# Patient Record
Sex: FEMALE | Race: WHITE | Employment: UNEMPLOYED | URBAN - METROPOLITAN AREA
[De-identification: names, ages, dates, MRNs, and addresses within clinical notes are randomized per-mention and may not be internally consistent; named-entity substitution may affect disease eponyms.]

---

## 2019-01-31 ENCOUNTER — HOSPITAL ENCOUNTER (OUTPATIENT)
Age: 39
Discharge: HOME | End: 2019-02-02
Payer: COMMERCIAL

## 2019-01-31 PROCEDURE — 88304 TISSUE EXAM BY PATHOLOGIST: CPT

## 2019-05-15 ENCOUNTER — OFFICE VISIT (OUTPATIENT)
Dept: FAMILY MEDICINE CLINIC | Age: 39
End: 2019-05-15
Payer: COMMERCIAL

## 2019-05-15 VITALS
HEART RATE: 86 BPM | RESPIRATION RATE: 15 BRPM | DIASTOLIC BLOOD PRESSURE: 72 MMHG | WEIGHT: 217 LBS | SYSTOLIC BLOOD PRESSURE: 100 MMHG | OXYGEN SATURATION: 98 % | BODY MASS INDEX: 36.15 KG/M2 | TEMPERATURE: 97.8 F | HEIGHT: 65 IN

## 2019-05-15 DIAGNOSIS — R05.9 COUGH: ICD-10-CM

## 2019-05-15 DIAGNOSIS — H65.03 BILATERAL ACUTE SEROUS OTITIS MEDIA, RECURRENCE NOT SPECIFIED: Primary | ICD-10-CM

## 2019-05-15 PROCEDURE — 99213 OFFICE O/P EST LOW 20 MIN: CPT | Performed by: FAMILY MEDICINE

## 2019-05-15 RX ORDER — POTASSIUM CITRATE 15 MEQ/1
15 TABLET, EXTENDED RELEASE ORAL
COMMUNITY

## 2019-05-15 RX ORDER — SULFAMETHOXAZOLE AND TRIMETHOPRIM 800; 160 MG/1; MG/1
1 TABLET ORAL 2 TIMES DAILY
Qty: 14 TABLET | Refills: 0 | Status: SHIPPED | OUTPATIENT
Start: 2019-05-15 | End: 2019-05-22

## 2019-05-15 RX ORDER — HYOSCYAMINE SULFATE EXTENDED-RELEASE 0.38 MG/1
375 TABLET ORAL EVERY 12 HOURS PRN
COMMUNITY
End: 2021-10-04

## 2019-05-15 RX ORDER — FLUVOXAMINE MALEATE 100 MG
150 TABLET ORAL NIGHTLY
COMMUNITY

## 2019-05-15 RX ORDER — PREGABALIN 100 MG/1
100 CAPSULE ORAL 2 TIMES DAILY
COMMUNITY
End: 2019-08-16 | Stop reason: SDUPTHER

## 2019-05-15 RX ORDER — DEXTROAMPHETAMINE SACCHARATE, AMPHETAMINE ASPARTATE, DEXTROAMPHETAMINE SULFATE AND AMPHETAMINE SULFATE 2.5; 2.5; 2.5; 2.5 MG/1; MG/1; MG/1; MG/1
10 TABLET ORAL DAILY
COMMUNITY
End: 2021-10-04

## 2019-05-15 RX ORDER — VALACYCLOVIR HYDROCHLORIDE 500 MG/1
500 TABLET, FILM COATED ORAL DAILY
COMMUNITY
End: 2021-10-04

## 2019-05-15 RX ORDER — PREDNISONE 10 MG/1
TABLET ORAL
Qty: 18 TABLET | Refills: 0 | Status: SHIPPED | OUTPATIENT
Start: 2019-05-15 | End: 2019-05-24

## 2019-05-15 ASSESSMENT — ENCOUNTER SYMPTOMS
SHORTNESS OF BREATH: 0
EYE DISCHARGE: 0
DIARRHEA: 0
RHINORRHEA: 1
TROUBLE SWALLOWING: 0
COUGH: 0
NAUSEA: 0
ABDOMINAL PAIN: 0
CHEST TIGHTNESS: 0
BLOOD IN STOOL: 0
SORE THROAT: 1
VOMITING: 0
SINUS PRESSURE: 1
BACK PAIN: 0
EYE REDNESS: 0
SINUS PAIN: 1
EYE PAIN: 0
ALLERGIC/IMMUNOLOGIC NEGATIVE: 1
PHOTOPHOBIA: 0

## 2019-05-15 NOTE — PROGRESS NOTES
5/15/19  Luis Daniel Isbell : 1980 Sex: female  Age: 45 y.o. Chief Complaint   Patient presents with    Otalgia       Congestion, pressure, drainage, facial tenderness, mild headache, onset 2 weeks ago. Denies fever, chills, diaphoresis, nausea, vomiting, decreased oral intake. Denies other GI or  complaints. OTC treatments minimally effective. Review of Systems   Constitutional: Negative for appetite change, fatigue and unexpected weight change. HENT: Positive for congestion, postnasal drip, rhinorrhea, sinus pressure, sinus pain and sore throat. Negative for ear pain, hearing loss and trouble swallowing. Eyes: Negative for photophobia, pain, discharge and redness. Respiratory: Negative for cough, chest tightness and shortness of breath. Cardiovascular: Negative for chest pain, palpitations and leg swelling. Gastrointestinal: Negative for abdominal pain, blood in stool, diarrhea, nausea and vomiting. Endocrine: Negative. Genitourinary: Negative for dysuria, flank pain, frequency and hematuria. Musculoskeletal: Negative for arthralgias, back pain, joint swelling and myalgias. Skin: Negative. Allergic/Immunologic: Negative. Neurological: Negative for dizziness, seizures, syncope, weakness, light-headedness, numbness and headaches. Hematological: Negative for adenopathy. Does not bruise/bleed easily. Psychiatric/Behavioral: Negative. Current Outpatient Medications:     amphetamine-dextroamphetamine (ADDERALL) 10 MG tablet, Take 10 mg by mouth daily. , Disp: , Rfl:     brexpiprazole (REXULTI) 4 MG TABS tablet, Take 4 mg by mouth daily, Disp: , Rfl:     valACYclovir (VALTREX) 500 MG tablet, Take 500 mg by mouth daily, Disp: , Rfl:     fluvoxaMINE (LUVOX) 100 MG tablet, Take 100 mg by mouth nightly 3 po qd at hs, Disp: , Rfl:     Potassium Citrate ER 15 MEQ (1620 MG) TBCR, Take 15 mEq by mouth 4 tabs po qd, Disp: , Rfl:     hyoscyamine (LEVBID) 375 MCG extended release tablet, Take 375 mcg by mouth every 12 hours as needed for Cramping, Disp: , Rfl:     mupirocin (BACTROBAN) 2 % ointment, Apply topically 3 times daily Apply topically 3 times daily. , Disp: , Rfl:     pregabalin (LYRICA) 100 MG capsule, Take 100 mg by mouth 2 times daily. , Disp: , Rfl:     predniSONE (DELTASONE) 10 MG tablet, Take 3 tablets by mouth daily for 3 days, THEN 2 tablets daily for 3 days, THEN 1 tablet daily for 3 days. , Disp: 18 tablet, Rfl: 0    sulfamethoxazole-trimethoprim (BACTRIM DS;SEPTRA DS) 800-160 MG per tablet, Take 1 tablet by mouth 2 times daily for 7 days, Disp: 14 tablet, Rfl: 0    folic acid (FOLVITE) 1 MG tablet, Take 1 mg by mouth daily, Disp: , Rfl:     ALPRAZolam (XANAX) 0.5 MG tablet, Take 0.5 mg by mouth nightly as needed for Sleep, Disp: , Rfl:     lisdexamfetamine (VYVANSE) 50 MG capsule, Take 50 mg by mouth every morning, Disp: , Rfl:     isoniazid (NYDRAZID) 300 MG tablet, Take 300 mg by mouth daily. , Disp: , Rfl:   Allergies   Allergen Reactions    Latex     Ceclor [Cefaclor]     Penicillins        Past Medical History:   Diagnosis Date    Anxiety     Depression     Kidney stone      Past Surgical History:   Procedure Laterality Date    TONSILLECTOMY       No family history on file. Social History     Socioeconomic History    Marital status:      Spouse name: Not on file    Number of children: Not on file    Years of education: Not on file    Highest education level: Not on file   Occupational History    Not on file   Social Needs    Financial resource strain: Not on file    Food insecurity:     Worry: Not on file     Inability: Not on file    Transportation needs:     Medical: Not on file     Non-medical: Not on file   Tobacco Use    Smoking status: Never Smoker    Smokeless tobacco: Never Used   Substance and Sexual Activity    Alcohol use:  Yes    Drug use: No    Sexual activity: Not on file   Lifestyle    Physical activity:     Days per week: Not on file     Minutes per session: Not on file    Stress: Not on file   Relationships    Social connections:     Talks on phone: Not on file     Gets together: Not on file     Attends Congregational service: Not on file     Active member of club or organization: Not on file     Attends meetings of clubs or organizations: Not on file     Relationship status: Not on file    Intimate partner violence:     Fear of current or ex partner: Not on file     Emotionally abused: Not on file     Physically abused: Not on file     Forced sexual activity: Not on file   Other Topics Concern    Not on file   Social History Narrative    Not on file       Vitals:    05/15/19 1605   BP: 100/72   Pulse: 86   Resp: 15   Temp: 97.8 °F (36.6 °C)   TempSrc: Temporal   SpO2: 98%   Weight: 217 lb (98.4 kg)   Height: 5' 4.5\" (1.638 m)       Physical Exam   Constitutional: She is oriented to person, place, and time. She appears well-developed and well-nourished. HENT:   Head: Atraumatic. Right Ear: A middle ear effusion is present. Left Ear: A middle ear effusion is present. Nose: Nose normal.   Mouth/Throat: Posterior oropharyngeal erythema present. No oropharyngeal exudate. Eyes: Pupils are equal, round, and reactive to light. Conjunctivae and EOM are normal.   Neck: Normal range of motion. Neck supple. No tracheal deviation present. No thyromegaly present. Cardiovascular: Normal rate, regular rhythm and intact distal pulses. Exam reveals no gallop and no friction rub. No murmur heard. Pulmonary/Chest: Effort normal and breath sounds normal. No respiratory distress. Abdominal: Soft. Bowel sounds are normal.   Musculoskeletal: Normal range of motion. She exhibits no edema, tenderness or deformity. Lymphadenopathy:     She has no cervical adenopathy. Neurological: She is alert and oriented to person, place, and time. She displays normal reflexes. No sensory deficit.  She exhibits normal muscle

## 2019-08-15 ENCOUNTER — TELEPHONE (OUTPATIENT)
Dept: FAMILY MEDICINE CLINIC | Age: 39
End: 2019-08-15

## 2019-08-15 NOTE — TELEPHONE ENCOUNTER
Pt is requesting a new rx be sent to [facility] on [address] in [place] for her Lyrica, requesting KAYLEE d/t cost of generic. Please call pt with any questions.

## 2019-08-16 DIAGNOSIS — G43.009 MIGRAINE WITHOUT AURA, NOT REFRACTORY: Primary | ICD-10-CM

## 2019-08-16 RX ORDER — PREGABALIN 100 MG/1
100 CAPSULE ORAL 2 TIMES DAILY
Qty: 60 CAPSULE | Refills: 2 | Status: SHIPPED | OUTPATIENT
Start: 2019-08-16 | End: 2019-08-26

## 2019-08-20 PROBLEM — K58.9 IBS (IRRITABLE BOWEL SYNDROME): Status: ACTIVE | Noted: 2019-08-20

## 2019-08-23 ENCOUNTER — TELEPHONE (OUTPATIENT)
Dept: FAMILY MEDICINE CLINIC | Age: 39
End: 2019-08-23

## 2019-08-23 NOTE — TELEPHONE ENCOUNTER
Sy calling about the script for Lyrica that was sent a couple days ago. She said that she does not want the Generic b/c it is more expensive than the brand name. She also wants refills on it.

## 2019-08-26 ENCOUNTER — OFFICE VISIT (OUTPATIENT)
Dept: FAMILY MEDICINE CLINIC | Age: 39
End: 2019-08-26
Payer: COMMERCIAL

## 2019-08-26 ENCOUNTER — HOSPITAL ENCOUNTER (OUTPATIENT)
Age: 39
Discharge: HOME | End: 2019-08-28
Payer: COMMERCIAL

## 2019-08-26 VITALS
RESPIRATION RATE: 15 BRPM | WEIGHT: 215 LBS | HEART RATE: 89 BPM | TEMPERATURE: 97.6 F | SYSTOLIC BLOOD PRESSURE: 110 MMHG | BODY MASS INDEX: 35.82 KG/M2 | HEIGHT: 65 IN | DIASTOLIC BLOOD PRESSURE: 84 MMHG | OXYGEN SATURATION: 99 %

## 2019-08-26 DIAGNOSIS — H65.03 NON-RECURRENT ACUTE SEROUS OTITIS MEDIA OF BOTH EARS: ICD-10-CM

## 2019-08-26 DIAGNOSIS — M54.50 BILATERAL LOW BACK PAIN WITHOUT SCIATICA, UNSPECIFIED CHRONICITY: ICD-10-CM

## 2019-08-26 DIAGNOSIS — R30.0 DYSURIA: ICD-10-CM

## 2019-08-26 DIAGNOSIS — R30.0 DYSURIA: Primary | ICD-10-CM

## 2019-08-26 LAB
APPEARANCE FLUID: NORMAL
BILIRUBIN, POC: NORMAL
BLOOD URINE, POC: NORMAL
CLARITY, POC: CLEAR
COLOR, POC: YELLOW
CONTROL: NORMAL
GLUCOSE URINE, POC: NORMAL
KETONES, POC: NORMAL
LEUKOCYTE EST, POC: NORMAL
NITRITE, POC: NORMAL
PH, POC: 8.5
PREGNANCY TEST URINE, POC: NORMAL
PROTEIN, POC: NORMAL
SPECIFIC GRAVITY, POC: 1.01
UROBILINOGEN, POC: 0.2

## 2019-08-26 PROCEDURE — 87088 URINE BACTERIA CULTURE: CPT

## 2019-08-26 PROCEDURE — 81002 URINALYSIS NONAUTO W/O SCOPE: CPT | Performed by: FAMILY MEDICINE

## 2019-08-26 PROCEDURE — 99214 OFFICE O/P EST MOD 30 MIN: CPT | Performed by: FAMILY MEDICINE

## 2019-08-26 PROCEDURE — 81025 URINE PREGNANCY TEST: CPT | Performed by: FAMILY MEDICINE

## 2019-08-26 RX ORDER — AZITHROMYCIN 250 MG/1
250 TABLET, FILM COATED ORAL SEE ADMIN INSTRUCTIONS
Qty: 6 TABLET | Refills: 0 | Status: SHIPPED | OUTPATIENT
Start: 2019-08-26 | End: 2019-08-31

## 2019-08-26 RX ORDER — PREGABALIN 50 MG/1
50 CAPSULE ORAL 3 TIMES DAILY
Qty: 90 CAPSULE | Refills: 1 | Status: SHIPPED | OUTPATIENT
Start: 2019-08-26 | End: 2019-09-04 | Stop reason: CLARIF

## 2019-08-26 ASSESSMENT — ENCOUNTER SYMPTOMS
SINUS PAIN: 0
EYE REDNESS: 0
EYE PAIN: 0
EYE DISCHARGE: 0
SHORTNESS OF BREATH: 0
BLOOD IN STOOL: 0
ALLERGIC/IMMUNOLOGIC NEGATIVE: 1
VOMITING: 0
TROUBLE SWALLOWING: 0
PHOTOPHOBIA: 0
DIARRHEA: 0
ABDOMINAL PAIN: 0
CHEST TIGHTNESS: 0
SORE THROAT: 0
NAUSEA: 0
COUGH: 0
BACK PAIN: 1

## 2019-08-26 NOTE — PROGRESS NOTES
19  [facility] 1980 Sex: female  Age: 45 y.o. Chief Complaint   Patient presents with    Otalgia    Lower Back Pain       Congestion, pressure, drainage, facial tenderness, mild earache onset 5 days ago. Denies fever, chills, diaphoresis, nausea, vomiting, decreased oral intake. Denies other GI or  complaints. OTC treatments minimally effective. Back pain. The patient states that they are experiencing low back pain. The pain started 2 weeks ago. The patient denies any trauma or injury. The pain is currently a 410 on the pain scale and is described as sharp. The patient has been using heat at home with no relief of their symptoms. The pain is worse with movement. The patient denies any radicular symptoms. They deny any numbness, tingling or weakness to the extremities. They deny any saddle anesthesia. No bowel or bladder incontinence. No fever or chills. No dysuria, urinary, frequency, or gross hematuria. No abdominal pain, nausea, vomiting, or diarrhea. No history of kidney stones. Review of Systems   Constitutional: Negative for appetite change, fatigue and unexpected weight change. HENT: Positive for congestion and ear pain. Negative for hearing loss, sinus pain, sore throat and trouble swallowing. Eyes: Negative for photophobia, pain, discharge and redness. Respiratory: Negative for cough, chest tightness and shortness of breath. Cardiovascular: Negative for chest pain, palpitations and leg swelling. Gastrointestinal: Negative for abdominal pain, blood in stool, diarrhea, nausea and vomiting. Endocrine: Negative. Genitourinary: Negative for dysuria, flank pain, frequency and hematuria. Musculoskeletal: Positive for back pain and myalgias. Negative for arthralgias and joint swelling. Skin: Negative. Allergic/Immunologic: Negative.     Neurological: Negative for dizziness, seizures, syncope, weakness, light-headedness, numbness and headaches. Hematological: Negative for adenopathy. Does not bruise/bleed easily. Psychiatric/Behavioral: Negative. Current Outpatient Medications:     cholestyramine light 4 g POWD, Take by mouth 2 times daily, Disp: , Rfl:     azithromycin (ZITHROMAX) 250 MG tablet, Take 1 tablet by mouth See Admin Instructions for 5 days 500mg on day 1 followed by 250mg on days 2 - 5, Disp: 6 tablet, Rfl: 0    pregabalin (LYRICA) 50 MG capsule, Take 1 capsule by mouth 3 times daily for 90 days. , Disp: 90 capsule, Rfl: 1    amphetamine-dextroamphetamine (ADDERALL) 10 MG tablet, Take 10 mg by mouth daily. , Disp: , Rfl:     brexpiprazole (REXULTI) 4 MG TABS tablet, Take 4 mg by mouth daily, Disp: , Rfl:     valACYclovir (VALTREX) 500 MG tablet, Take 500 mg by mouth daily, Disp: , Rfl:     fluvoxaMINE (LUVOX) 100 MG tablet, Take 100 mg by mouth nightly 3 po qd at hs, Disp: , Rfl:     Potassium Citrate ER 15 MEQ (1620 MG) TBCR, Take 15 mEq by mouth 4 tabs po qd, Disp: , Rfl:     hyoscyamine (LEVBID) 375 MCG extended release tablet, Take 375 mcg by mouth every 12 hours as needed for Cramping, Disp: , Rfl:     folic acid (FOLVITE) 1 MG tablet, Take 1 mg by mouth daily, Disp: , Rfl:     ALPRAZolam (XANAX) 0.5 MG tablet, Take 0.5 mg by mouth nightly as needed for Sleep, Disp: , Rfl:     lisdexamfetamine (VYVANSE) 50 MG capsule, Take 50 mg by mouth every morning, Disp: , Rfl:     mupirocin (BACTROBAN) 2 % ointment, Apply topically 3 times daily Apply topically 3 times daily. , Disp: , Rfl:     isoniazid (NYDRAZID) 300 MG tablet, Take 300 mg by mouth daily.   , Disp: , Rfl:   Allergies   Allergen Reactions    Latex     Ceclor [Cefaclor]     Penicillins        Past Medical History:   Diagnosis Date    ADHD     Anxiety     Depression     GERD (gastroesophageal reflux disease)     Granuloma annulare     Kidney stone     Migraine      Past Surgical History:   Procedure Laterality Date    FINGER SURGERY Left DO

## 2019-08-28 LAB — URINE CULTURE, ROUTINE: NORMAL

## 2019-09-04 ENCOUNTER — OFFICE VISIT (OUTPATIENT)
Dept: FAMILY MEDICINE CLINIC | Age: 39
End: 2019-09-04
Payer: COMMERCIAL

## 2019-09-04 VITALS
SYSTOLIC BLOOD PRESSURE: 118 MMHG | HEART RATE: 120 BPM | DIASTOLIC BLOOD PRESSURE: 80 MMHG | RESPIRATION RATE: 15 BRPM | WEIGHT: 215 LBS | OXYGEN SATURATION: 98 % | HEIGHT: 65 IN | BODY MASS INDEX: 35.82 KG/M2 | TEMPERATURE: 96.6 F

## 2019-09-04 DIAGNOSIS — H69.83 ACUTE DYSFUNCTION OF EUSTACHIAN TUBE, BILATERAL: Primary | ICD-10-CM

## 2019-09-04 PROBLEM — H69.93 ACUTE DYSFUNCTION OF EUSTACHIAN TUBE, BILATERAL: Status: ACTIVE | Noted: 2019-09-04

## 2019-09-04 PROCEDURE — 99213 OFFICE O/P EST LOW 20 MIN: CPT | Performed by: FAMILY MEDICINE

## 2019-09-04 RX ORDER — PREGABALIN 50 MG/1
50 CAPSULE ORAL DAILY
Qty: 30 CAPSULE | Refills: 2 | Status: SHIPPED | OUTPATIENT
Start: 2019-09-04 | End: 2020-01-29 | Stop reason: SDUPTHER

## 2019-09-04 RX ORDER — PREDNISONE 10 MG/1
TABLET ORAL
Qty: 18 TABLET | Refills: 0 | Status: SHIPPED | OUTPATIENT
Start: 2019-09-04 | End: 2019-09-13

## 2019-09-04 ASSESSMENT — ENCOUNTER SYMPTOMS
BACK PAIN: 0
EYE DISCHARGE: 0
EYE REDNESS: 0
SORE THROAT: 0
ALLERGIC/IMMUNOLOGIC NEGATIVE: 1
ABDOMINAL PAIN: 0
COUGH: 0
CHEST TIGHTNESS: 0
NAUSEA: 0
SHORTNESS OF BREATH: 0
TROUBLE SWALLOWING: 0
VOMITING: 0
SINUS PAIN: 0
EYE PAIN: 0
PHOTOPHOBIA: 0
BLOOD IN STOOL: 0
DIARRHEA: 0

## 2019-09-04 NOTE — PROGRESS NOTES
Rfl:     brexpiprazole (REXULTI) 4 MG TABS tablet, Take 4 mg by mouth daily, Disp: , Rfl:     valACYclovir (VALTREX) 500 MG tablet, Take 500 mg by mouth daily, Disp: , Rfl:     fluvoxaMINE (LUVOX) 100 MG tablet, Take 100 mg by mouth nightly 3 po qd at hs, Disp: , Rfl:     Potassium Citrate ER 15 MEQ (1620 MG) TBCR, Take 15 mEq by mouth 4 tabs po qd, Disp: , Rfl:     hyoscyamine (LEVBID) 375 MCG extended release tablet, Take 375 mcg by mouth every 12 hours as needed for Cramping, Disp: , Rfl:     mupirocin (BACTROBAN) 2 % ointment, Apply topically 3 times daily Apply topically 3 times daily. , Disp: , Rfl:     folic acid (FOLVITE) 1 MG tablet, Take 1 mg by mouth daily, Disp: , Rfl:     ALPRAZolam (XANAX) 0.5 MG tablet, Take 0.5 mg by mouth nightly as needed for Sleep, Disp: , Rfl:     lisdexamfetamine (VYVANSE) 50 MG capsule, Take 50 mg by mouth every morning, Disp: , Rfl:     isoniazid (NYDRAZID) 300 MG tablet, Take 300 mg by mouth daily.   , Disp: , Rfl:   Allergies   Allergen Reactions    Latex     Ceclor [Cefaclor]     Penicillins        Past Medical History:   Diagnosis Date    ADHD     Anxiety     Depression     GERD (gastroesophageal reflux disease)     Granuloma annulare     Kidney stone     Migraine      Past Surgical History:   Procedure Laterality Date    FINGER SURGERY Left 08/05/2015    Dr. Tal Sherwood, ring finger; path report = granuloma annulare    TONSILLECTOMY       Family History   Problem Relation Age of Onset    Uterine Cancer Mother     Thyroid Disease Mother     Osteoporosis Mother     No Known Problems Father      Social History     Socioeconomic History    Marital status:      Spouse name: Not on file    Number of children: Not on file    Years of education: Not on file    Highest education level: Not on file   Occupational History    Occupation: previous phlebotomist, teacher   Social Needs    Financial resource strain: Not on file    Food insecurity:

## 2019-09-19 RX ORDER — CLOMIPRAMINE HYDROCHLORIDE 50 MG/1
200 CAPSULE ORAL
COMMUNITY
End: 2019-12-06

## 2019-09-19 RX ORDER — FLUCONAZOLE 150 MG/1
TABLET ORAL
Refills: 3 | COMMUNITY
Start: 2019-08-22 | End: 2019-12-06

## 2019-09-19 RX ORDER — SULFAMETHOXAZOLE AND TRIMETHOPRIM 800; 160 MG/1; MG/1
TABLET ORAL
COMMUNITY
Start: 2019-04-11 | End: 2019-12-06

## 2019-09-25 ENCOUNTER — HOSPITAL ENCOUNTER (OUTPATIENT)
Age: 39
Discharge: HOME | End: 2019-09-27
Payer: COMMERCIAL

## 2019-09-25 LAB
FERRITIN: 64 NG/ML
IRON SATURATION: 48 % (ref 15–50)
IRON: 169 MCG/DL (ref 37–145)
TOTAL IRON BINDING CAPACITY: 352 MCG/DL (ref 250–450)

## 2019-09-25 PROCEDURE — 36415 COLL VENOUS BLD VENIPUNCTURE: CPT

## 2019-09-25 PROCEDURE — 82787 IGG 1 2 3 OR 4 EACH: CPT

## 2019-09-25 PROCEDURE — 82728 ASSAY OF FERRITIN: CPT

## 2019-09-25 PROCEDURE — 83550 IRON BINDING TEST: CPT

## 2019-09-25 PROCEDURE — 86664 EPSTEIN-BARR NUCLEAR ANTIGEN: CPT

## 2019-09-25 PROCEDURE — 83540 ASSAY OF IRON: CPT

## 2019-09-25 PROCEDURE — 86665 EPSTEIN-BARR CAPSID VCA: CPT

## 2019-09-25 PROCEDURE — 86663 EPSTEIN-BARR ANTIBODY: CPT

## 2019-09-28 LAB
EPSTEIN BARR VIRUS NUCLEAR AB IGG: 78 U/ML (ref 0–21.9)
EPSTEIN-BARR EARLY ANTIGEN ANTIBODY: 30.2 U/ML (ref 0–10.9)
EPSTEIN-BARR VCA IGG: >750 U/ML (ref 0–21.9)
EPSTEIN-BARR VCA IGM: <10 U/ML (ref 0–43.9)
IGG 1: 317 MG/DL (ref 240–1118)
IGG 2: 139 MG/DL (ref 124–549)
IGG 3: 15 MG/DL (ref 21–134)
IGG 4: 22 MG/DL (ref 1–123)

## 2019-10-01 ENCOUNTER — OFFICE VISIT (OUTPATIENT)
Dept: FAMILY MEDICINE CLINIC | Age: 39
End: 2019-10-01
Payer: COMMERCIAL

## 2019-10-01 VITALS
HEART RATE: 88 BPM | WEIGHT: 216 LBS | DIASTOLIC BLOOD PRESSURE: 88 MMHG | RESPIRATION RATE: 16 BRPM | OXYGEN SATURATION: 97 % | BODY MASS INDEX: 35.99 KG/M2 | HEIGHT: 65 IN | SYSTOLIC BLOOD PRESSURE: 122 MMHG

## 2019-10-01 DIAGNOSIS — H65.03 NON-RECURRENT ACUTE SEROUS OTITIS MEDIA OF BOTH EARS: Primary | ICD-10-CM

## 2019-10-01 PROCEDURE — 99213 OFFICE O/P EST LOW 20 MIN: CPT | Performed by: FAMILY MEDICINE

## 2019-10-01 RX ORDER — CLARITHROMYCIN 500 MG/1
500 TABLET, COATED ORAL 2 TIMES DAILY
Qty: 20 TABLET | Refills: 0 | Status: SHIPPED | OUTPATIENT
Start: 2019-10-01 | End: 2019-10-11

## 2019-10-24 ENCOUNTER — OFFICE VISIT (OUTPATIENT)
Dept: FAMILY MEDICINE CLINIC | Age: 39
End: 2019-10-24
Payer: COMMERCIAL

## 2019-10-24 VITALS
TEMPERATURE: 98.2 F | DIASTOLIC BLOOD PRESSURE: 70 MMHG | OXYGEN SATURATION: 98 % | SYSTOLIC BLOOD PRESSURE: 114 MMHG | HEART RATE: 102 BPM

## 2019-10-24 DIAGNOSIS — J01.91 ACUTE RECURRENT SINUSITIS, UNSPECIFIED LOCATION: Primary | ICD-10-CM

## 2019-10-24 DIAGNOSIS — H69.83 ACUTE DYSFUNCTION OF EUSTACHIAN TUBE, BILATERAL: ICD-10-CM

## 2019-10-24 DIAGNOSIS — R51.9 NONINTRACTABLE HEADACHE, UNSPECIFIED CHRONICITY PATTERN, UNSPECIFIED HEADACHE TYPE: ICD-10-CM

## 2019-10-24 PROCEDURE — 99213 OFFICE O/P EST LOW 20 MIN: CPT | Performed by: INTERNAL MEDICINE

## 2019-10-24 RX ORDER — PREDNISONE 10 MG/1
TABLET ORAL
Qty: 18 TABLET | Refills: 0 | Status: SHIPPED | OUTPATIENT
Start: 2019-10-24 | End: 2019-11-02

## 2019-10-24 RX ORDER — FLUTICASONE PROPIONATE 50 MCG
2 SPRAY, SUSPENSION (ML) NASAL DAILY
Qty: 1 BOTTLE | Refills: 0 | Status: SHIPPED | OUTPATIENT
Start: 2019-10-24 | End: 2019-12-06

## 2019-10-24 ASSESSMENT — ENCOUNTER SYMPTOMS
WHEEZING: 0
COUGH: 0
SINUS PRESSURE: 1
SORE THROAT: 0
CHEST TIGHTNESS: 0
EYES NEGATIVE: 1
SHORTNESS OF BREATH: 0
ABDOMINAL PAIN: 0

## 2019-10-31 ENCOUNTER — TELEPHONE (OUTPATIENT)
Dept: FAMILY MEDICINE CLINIC | Age: 39
End: 2019-10-31

## 2019-12-06 ENCOUNTER — OFFICE VISIT (OUTPATIENT)
Dept: FAMILY MEDICINE CLINIC | Age: 39
End: 2019-12-06
Payer: COMMERCIAL

## 2019-12-06 VITALS
HEIGHT: 64 IN | HEART RATE: 99 BPM | TEMPERATURE: 96.8 F | WEIGHT: 218 LBS | SYSTOLIC BLOOD PRESSURE: 102 MMHG | DIASTOLIC BLOOD PRESSURE: 80 MMHG | OXYGEN SATURATION: 98 % | BODY MASS INDEX: 37.22 KG/M2

## 2019-12-06 DIAGNOSIS — B37.0 THRUSH, ORAL: ICD-10-CM

## 2019-12-06 DIAGNOSIS — B37.31 VAGINAL THRUSH: Primary | ICD-10-CM

## 2019-12-06 PROCEDURE — G8417 CALC BMI ABV UP PARAM F/U: HCPCS | Performed by: FAMILY MEDICINE

## 2019-12-06 PROCEDURE — G8484 FLU IMMUNIZE NO ADMIN: HCPCS | Performed by: FAMILY MEDICINE

## 2019-12-06 PROCEDURE — 1036F TOBACCO NON-USER: CPT | Performed by: FAMILY MEDICINE

## 2019-12-06 PROCEDURE — G8427 DOCREV CUR MEDS BY ELIG CLIN: HCPCS | Performed by: FAMILY MEDICINE

## 2019-12-06 PROCEDURE — 99213 OFFICE O/P EST LOW 20 MIN: CPT | Performed by: FAMILY MEDICINE

## 2019-12-06 RX ORDER — CLOTRIMAZOLE 10 MG/1
LOZENGE ORAL; TOPICAL
COMMUNITY
Start: 2019-12-02 | End: 2020-01-30

## 2019-12-06 RX ORDER — FLUCONAZOLE 100 MG/1
100 TABLET ORAL DAILY
Qty: 3 TABLET | Refills: 0 | Status: SHIPPED | OUTPATIENT
Start: 2019-12-06 | End: 2019-12-16

## 2019-12-06 ASSESSMENT — ENCOUNTER SYMPTOMS
TROUBLE SWALLOWING: 0
VOMITING: 0
COUGH: 0
ABDOMINAL PAIN: 0
EYE PAIN: 0
BACK PAIN: 0
SINUS PAIN: 0
EYE DISCHARGE: 0
SHORTNESS OF BREATH: 0
EYE REDNESS: 0
DIARRHEA: 0
PHOTOPHOBIA: 0
NAUSEA: 0
CHEST TIGHTNESS: 0
ALLERGIC/IMMUNOLOGIC NEGATIVE: 1
SORE THROAT: 0
BLOOD IN STOOL: 0

## 2020-01-29 RX ORDER — PREGABALIN 50 MG/1
50 CAPSULE ORAL DAILY
Qty: 30 CAPSULE | Refills: 2 | Status: SHIPPED
Start: 2020-01-29 | End: 2020-03-11 | Stop reason: SDUPTHER

## 2020-01-30 ENCOUNTER — OFFICE VISIT (OUTPATIENT)
Dept: FAMILY MEDICINE CLINIC | Age: 40
End: 2020-01-30
Payer: COMMERCIAL

## 2020-01-30 ENCOUNTER — TELEPHONE (OUTPATIENT)
Dept: FAMILY MEDICINE CLINIC | Age: 40
End: 2020-01-30

## 2020-01-30 VITALS
TEMPERATURE: 97.6 F | HEIGHT: 64 IN | WEIGHT: 208.06 LBS | OXYGEN SATURATION: 98 % | BODY MASS INDEX: 35.52 KG/M2 | SYSTOLIC BLOOD PRESSURE: 116 MMHG | HEART RATE: 130 BPM | DIASTOLIC BLOOD PRESSURE: 60 MMHG

## 2020-01-30 PROBLEM — H68.013: Status: ACTIVE | Noted: 2020-01-30

## 2020-01-30 PROBLEM — B37.0 ORAL THRUSH: Status: ACTIVE | Noted: 2020-01-30

## 2020-01-30 PROCEDURE — 99213 OFFICE O/P EST LOW 20 MIN: CPT | Performed by: FAMILY MEDICINE

## 2020-01-30 PROCEDURE — G8431 POS CLIN DEPRES SCRN F/U DOC: HCPCS | Performed by: FAMILY MEDICINE

## 2020-01-30 PROCEDURE — G8482 FLU IMMUNIZE ORDER/ADMIN: HCPCS | Performed by: FAMILY MEDICINE

## 2020-01-30 PROCEDURE — G0444 DEPRESSION SCREEN ANNUAL: HCPCS | Performed by: FAMILY MEDICINE

## 2020-01-30 PROCEDURE — 1036F TOBACCO NON-USER: CPT | Performed by: FAMILY MEDICINE

## 2020-01-30 PROCEDURE — G8417 CALC BMI ABV UP PARAM F/U: HCPCS | Performed by: FAMILY MEDICINE

## 2020-01-30 PROCEDURE — G8427 DOCREV CUR MEDS BY ELIG CLIN: HCPCS | Performed by: FAMILY MEDICINE

## 2020-01-30 RX ORDER — FLUCONAZOLE 100 MG/1
100 TABLET ORAL DAILY
Qty: 3 TABLET | Refills: 0 | Status: SHIPPED | OUTPATIENT
Start: 2020-01-30 | End: 2020-02-09

## 2020-01-30 RX ORDER — KETOCONAZOLE 20 MG/G
CREAM TOPICAL
Refills: 2 | COMMUNITY
Start: 2019-12-06 | End: 2020-03-11

## 2020-01-30 RX ORDER — NYSTATIN 100000 [USP'U]/G
POWDER TOPICAL
COMMUNITY
Start: 2020-01-02 | End: 2020-03-11

## 2020-01-30 ASSESSMENT — PATIENT HEALTH QUESTIONNAIRE - PHQ9
4. FEELING TIRED OR HAVING LITTLE ENERGY: 3
8. MOVING OR SPEAKING SO SLOWLY THAT OTHER PEOPLE COULD HAVE NOTICED. OR THE OPPOSITE, BEING SO FIGETY OR RESTLESS THAT YOU HAVE BEEN MOVING AROUND A LOT MORE THAN USUAL: 0
6. FEELING BAD ABOUT YOURSELF - OR THAT YOU ARE A FAILURE OR HAVE LET YOURSELF OR YOUR FAMILY DOWN: 2
3. TROUBLE FALLING OR STAYING ASLEEP: 3
2. FEELING DOWN, DEPRESSED OR HOPELESS: 2
SUM OF ALL RESPONSES TO PHQ QUESTIONS 1-9: 16
7. TROUBLE CONCENTRATING ON THINGS, SUCH AS READING THE NEWSPAPER OR WATCHING TELEVISION: 2
5. POOR APPETITE OR OVEREATING: 1
SUM OF ALL RESPONSES TO PHQ QUESTIONS 1-9: 16
1. LITTLE INTEREST OR PLEASURE IN DOING THINGS: 3
9. THOUGHTS THAT YOU WOULD BE BETTER OFF DEAD, OR OF HURTING YOURSELF: 0
10. IF YOU CHECKED OFF ANY PROBLEMS, HOW DIFFICULT HAVE THESE PROBLEMS MADE IT FOR YOU TO DO YOUR WORK, TAKE CARE OF THINGS AT HOME, OR GET ALONG WITH OTHER PEOPLE: 3
SUM OF ALL RESPONSES TO PHQ9 QUESTIONS 1 & 2: 5

## 2020-01-30 ASSESSMENT — ENCOUNTER SYMPTOMS
DIARRHEA: 0
TROUBLE SWALLOWING: 0
PHOTOPHOBIA: 0
BACK PAIN: 0
COUGH: 0
NAUSEA: 0
CHEST TIGHTNESS: 0
SHORTNESS OF BREATH: 0
ABDOMINAL PAIN: 0
EYE PAIN: 0
VOMITING: 0
ALLERGIC/IMMUNOLOGIC NEGATIVE: 1
SORE THROAT: 0
EYE DISCHARGE: 0
EYE REDNESS: 0
SINUS PAIN: 0
BLOOD IN STOOL: 0

## 2020-01-30 NOTE — PROGRESS NOTES
20  Rafia Bahena : 1980 Sex: female  Age: 44 y.o. Chief Complaint   Patient presents with    Otalgia     throbbing ear pain, bilateral, ongoing from last visit. pt advised not getting any better but worse    Thrush     pt advised that they have thrush and have been through rounds of treatment from another doctor and that it isn't clearing up. Coated tongue despite several roungs of Mycelex lozenges. Ears pressure, fullness as well. Denies fever, chills, diaphoresis, N/V      Review of Systems   Constitutional: Negative for appetite change, fatigue and unexpected weight change. HENT: Positive for ear pain. Negative for congestion, hearing loss, sinus pain, sore throat and trouble swallowing. Coated tongue   Eyes: Negative for photophobia, pain, discharge and redness. Respiratory: Negative for cough, chest tightness and shortness of breath. Cardiovascular: Negative for chest pain, palpitations and leg swelling. Gastrointestinal: Negative for abdominal pain, blood in stool, diarrhea, nausea and vomiting. Endocrine: Negative. Genitourinary: Negative for dysuria, flank pain, frequency and hematuria. Musculoskeletal: Negative for arthralgias, back pain, joint swelling and myalgias. Skin: Negative. Allergic/Immunologic: Negative. Neurological: Negative for dizziness, seizures, syncope, weakness, light-headedness, numbness and headaches. Hematological: Negative for adenopathy. Does not bruise/bleed easily. Psychiatric/Behavioral: Positive for decreased concentration and sleep disturbance. The patient is nervous/anxious.           Current Outpatient Medications:     ketoconazole (NIZORAL) 2 % cream, APPLY 1 APPLICATION TOPICALLY TWICE PER DAY FOR 1 MONTH, Disp: , Rfl: 2    nystatin (MYCOSTATIN) 020436 UNIT/GM powder, , Disp: , Rfl:     tretinoin (RETIN-A) 0.025 % cream, TAKE 1 APPLICATION TOPICALLY ONCE PER DAY FOR 1 MONTH APPLY TO FACE EVERY NIGHT, following plan was implemented: referral to psychiatry provided. Patient will follow-up in 2 week(s) with PCP.

## 2020-02-12 ENCOUNTER — OFFICE VISIT (OUTPATIENT)
Dept: FAMILY MEDICINE CLINIC | Age: 40
End: 2020-02-12
Payer: COMMERCIAL

## 2020-02-12 VITALS
TEMPERATURE: 96.9 F | HEIGHT: 65 IN | SYSTOLIC BLOOD PRESSURE: 120 MMHG | OXYGEN SATURATION: 98 % | HEART RATE: 104 BPM | WEIGHT: 208.02 LBS | DIASTOLIC BLOOD PRESSURE: 60 MMHG | BODY MASS INDEX: 34.66 KG/M2

## 2020-02-12 PROCEDURE — G8482 FLU IMMUNIZE ORDER/ADMIN: HCPCS | Performed by: FAMILY MEDICINE

## 2020-02-12 PROCEDURE — 1036F TOBACCO NON-USER: CPT | Performed by: FAMILY MEDICINE

## 2020-02-12 PROCEDURE — 99213 OFFICE O/P EST LOW 20 MIN: CPT | Performed by: FAMILY MEDICINE

## 2020-02-12 PROCEDURE — G8417 CALC BMI ABV UP PARAM F/U: HCPCS | Performed by: FAMILY MEDICINE

## 2020-02-12 PROCEDURE — G8427 DOCREV CUR MEDS BY ELIG CLIN: HCPCS | Performed by: FAMILY MEDICINE

## 2020-02-12 ASSESSMENT — ENCOUNTER SYMPTOMS
EYE PAIN: 0
EYE DISCHARGE: 0
ALLERGIC/IMMUNOLOGIC NEGATIVE: 1
VOMITING: 0
NAUSEA: 0
ABDOMINAL PAIN: 0
COUGH: 0
SORE THROAT: 0
DIARRHEA: 0
EYE REDNESS: 0
BACK PAIN: 0
BLOOD IN STOOL: 0
SHORTNESS OF BREATH: 0
TROUBLE SWALLOWING: 0
CHEST TIGHTNESS: 0
PHOTOPHOBIA: 0
SINUS PAIN: 0

## 2020-02-12 NOTE — PROGRESS NOTES
20  Zuleima June : 1980 Sex: female  Age: 44 y.o. Chief Complaint   Patient presents with    Otalgia    Thrush       Improved, tongue clearing, ears remain problematic      Review of Systems   Constitutional: Negative for appetite change, fatigue and unexpected weight change. HENT: Positive for ear pain. Negative for congestion, hearing loss, sinus pain, sore throat and trouble swallowing. Eyes: Negative for photophobia, pain, discharge and redness. Respiratory: Negative for cough, chest tightness and shortness of breath. Cardiovascular: Negative for chest pain, palpitations and leg swelling. Gastrointestinal: Negative for abdominal pain, blood in stool, diarrhea, nausea and vomiting. Endocrine: Negative. Genitourinary: Negative for dysuria, flank pain, frequency and hematuria. Musculoskeletal: Negative for arthralgias, back pain, joint swelling and myalgias. Skin: Negative. Allergic/Immunologic: Negative. Neurological: Negative for dizziness, seizures, syncope, weakness, light-headedness, numbness and headaches. Hematological: Negative for adenopathy. Does not bruise/bleed easily. Psychiatric/Behavioral: Negative. Current Outpatient Medications:     nystatin (MYCOSTATIN) 207443 UNIT/ML suspension, Retain in mouth as long as possible, use four times daily, Disp: 100 mL, Rfl: 1    ketoconazole (NIZORAL) 2 % cream, APPLY 1 APPLICATION TOPICALLY TWICE PER DAY FOR 1 MONTH, Disp: , Rfl: 2    nystatin (MYCOSTATIN) 454829 UNIT/GM powder, , Disp: , Rfl:     tretinoin (RETIN-A) 0.025 % cream, TAKE 1 APPLICATION TOPICALLY ONCE PER DAY FOR 1 MONTH APPLY TO FACE EVERY NIGHT, Disp: , Rfl:     pregabalin (LYRICA) 50 MG capsule, Take 1 capsule by mouth daily for 91 days. , Disp: 30 capsule, Rfl: 2    NONFORMULARY, A-EB/H6 ([employer]), Disp: , Rfl:     cholestyramine light 4 g POWD, Take by mouth 2 times daily, Disp: , Rfl:    amphetamine-dextroamphetamine (ADDERALL) 10 MG tablet, Take 10 mg by mouth daily. , Disp: , Rfl:     brexpiprazole (REXULTI) 4 MG TABS tablet, Take 2 mg by mouth daily , Disp: , Rfl:     valACYclovir (VALTREX) 500 MG tablet, Take 500 mg by mouth daily, Disp: , Rfl:     fluvoxaMINE (LUVOX) 100 MG tablet, Take 100 mg by mouth nightly 3 po qd at hs, Disp: , Rfl:     Potassium Citrate ER 15 MEQ (1620 MG) TBCR, Take 15 mEq by mouth 4 tabs po qd, Disp: , Rfl:     hyoscyamine (LEVBID) 375 MCG extended release tablet, Take 375 mcg by mouth every 12 hours as needed for Cramping, Disp: , Rfl:     folic acid (FOLVITE) 1 MG tablet, Take 1 mg by mouth daily, Disp: , Rfl:     ALPRAZolam (XANAX) 0.5 MG tablet, Take 0.5 mg by mouth nightly as needed for Sleep, Disp: , Rfl:     lisdexamfetamine (VYVANSE) 50 MG capsule, Take 70 mg by mouth every morning. , Disp: , Rfl:   Allergies   Allergen Reactions    Latex Other (See Comments) and Rash     Other reaction(s): Makes skin bleed  On contact      Cefaclor     Penicillin G     Penicillins     Seasonal Other (See Comments)    Sulfa Antibiotics        Past Medical History:   Diagnosis Date    ADHD     Anxiety     Depression     GERD (gastroesophageal reflux disease)     Granuloma annulare     Kidney stone     Migraine      Past Surgical History:   Procedure Laterality Date    FINGER SURGERY Left 08/05/2015    Dr. Leny Ruiz, ring finger; path report = granuloma annulare    TONSILLECTOMY       Family History   Problem Relation Age of Onset    Uterine Cancer Mother     Thyroid Disease Mother     Osteoporosis Mother     No Known Problems Father      Social History     Socioeconomic History    Marital status:      Spouse name: Not on file    Number of children: Not on file    Years of education: Not on file    Highest education level: Not on file   Occupational History    Occupation: previous phlebotomist, teacher   Social Needs    Financial resource strain: Not on file    Food insecurity:     Worry: Not on file     Inability: Not on file    Transportation needs:     Medical: Not on file     Non-medical: Not on file   Tobacco Use    Smoking status: Never Smoker    Smokeless tobacco: Never Used   Substance and Sexual Activity    Alcohol use: Yes     Comment: rarely    Drug use: No     Comment: remote marijuana use    Sexual activity: Not on file   Lifestyle    Physical activity:     Days per week: Not on file     Minutes per session: Not on file    Stress: Not on file   Relationships    Social connections:     Talks on phone: Not on file     Gets together: Not on file     Attends [employer] service: Not on file     Active member of club or organization: Not on file     Attends meetings of clubs or organizations: Not on file     Relationship status: Not on file    Intimate partner violence:     Fear of current or ex partner: Not on file     Emotionally abused: Not on file     Physically abused: Not on file     Forced sexual activity: Not on file   Other Topics Concern    Not on file   Social History Narrative    Not on file       Vitals:    02/12/20 1405   BP: 120/60   Pulse: 104   Temp: 96.9 °F (36.1 °C)   TempSrc: Temporal   SpO2: 98%   Weight: 208 lb 0.3 oz (94.4 kg)   Height: 5' 4.5\" (1.638 m)       Physical Exam  Vitals signs and nursing note reviewed. Constitutional:       Appearance: She is well-developed. HENT:      Head: Atraumatic. Right Ear: External ear normal.      Left Ear: External ear normal.      Nose: Nose normal.      Mouth/Throat:      Tongue: No lesions. Pharynx: No oropharyngeal exudate. Eyes:      Conjunctiva/sclera: Conjunctivae normal.      Pupils: Pupils are equal, round, and reactive to light. Neck:      Musculoskeletal: Normal range of motion and neck supple. Thyroid: No thyromegaly. Trachea: No tracheal deviation. Cardiovascular:      Rate and Rhythm: Normal rate and regular rhythm.       Heart sounds: No murmur. No friction rub. No gallop. Pulmonary:      Effort: Pulmonary effort is normal. No respiratory distress. Breath sounds: Normal breath sounds. Abdominal:      General: Bowel sounds are normal.      Palpations: Abdomen is soft. Musculoskeletal: Normal range of motion. General: No tenderness or deformity. Lymphadenopathy:      Cervical: No cervical adenopathy. Skin:     General: Skin is warm and dry. Capillary Refill: Capillary refill takes less than 2 seconds. Findings: No rash. Neurological:      Mental Status: She is alert and oriented to person, place, and time. Sensory: No sensory deficit. Motor: No abnormal muscle tone. Coordination: Coordination normal.      Deep Tendon Reflexes: Reflexes normal.         Assessment and Plan:  Donta Loyd was seen today for otalgia and thrush. Diagnoses and all orders for this visit:    Oral thrush  -     nystatin (MYCOSTATIN) 222657 UNIT/ML suspension; Retain in mouth as long as possible, use four times daily        Return in about 4 weeks (around 3/11/2020).       Seen By:  Kirsty Dinwiddie, DO

## 2020-03-11 ENCOUNTER — OFFICE VISIT (OUTPATIENT)
Dept: FAMILY MEDICINE CLINIC | Age: 40
End: 2020-03-11
Payer: COMMERCIAL

## 2020-03-11 VITALS
OXYGEN SATURATION: 97 % | WEIGHT: 208.08 LBS | HEIGHT: 65 IN | HEART RATE: 115 BPM | DIASTOLIC BLOOD PRESSURE: 80 MMHG | BODY MASS INDEX: 34.67 KG/M2 | TEMPERATURE: 96.7 F | SYSTOLIC BLOOD PRESSURE: 118 MMHG

## 2020-03-11 PROBLEM — B37.0 ORAL THRUSH: Status: RESOLVED | Noted: 2020-01-30 | Resolved: 2020-03-11

## 2020-03-11 PROCEDURE — 1036F TOBACCO NON-USER: CPT | Performed by: FAMILY MEDICINE

## 2020-03-11 PROCEDURE — G8482 FLU IMMUNIZE ORDER/ADMIN: HCPCS | Performed by: FAMILY MEDICINE

## 2020-03-11 PROCEDURE — 99213 OFFICE O/P EST LOW 20 MIN: CPT | Performed by: FAMILY MEDICINE

## 2020-03-11 PROCEDURE — G8417 CALC BMI ABV UP PARAM F/U: HCPCS | Performed by: FAMILY MEDICINE

## 2020-03-11 PROCEDURE — G8427 DOCREV CUR MEDS BY ELIG CLIN: HCPCS | Performed by: FAMILY MEDICINE

## 2020-03-11 RX ORDER — PREGABALIN 50 MG/1
50 CAPSULE ORAL 2 TIMES DAILY
Qty: 180 CAPSULE | Refills: 1 | Status: SHIPPED
Start: 2020-03-11 | End: 2020-10-13 | Stop reason: SDUPTHER

## 2020-03-11 RX ORDER — XYLITOL
POWDER (GRAM) MISCELLANEOUS 2 TIMES DAILY
COMMUNITY
End: 2020-10-13

## 2020-03-11 ASSESSMENT — ENCOUNTER SYMPTOMS
DIARRHEA: 0
EYE REDNESS: 0
CHEST TIGHTNESS: 0
ABDOMINAL PAIN: 0
BACK PAIN: 0
EYE PAIN: 0
VOMITING: 0
SHORTNESS OF BREATH: 0
SORE THROAT: 0
ALLERGIC/IMMUNOLOGIC NEGATIVE: 1
SINUS PAIN: 0
NAUSEA: 0
EYE DISCHARGE: 0
COUGH: 0
PHOTOPHOBIA: 0
TROUBLE SWALLOWING: 0
BLOOD IN STOOL: 0

## 2020-03-11 NOTE — PROGRESS NOTES
3/11/20  Claudette Balsam : 1980 Sex: female  Age: 44 y.o. Chief Complaint   Patient presents with    Irritable Bowel Syndrome     one month, has mouth sores and would like to see if they could get an RX for magic mouth wash. Recheck abdomen, stable. Leg pain, Lyrica not effective for leg pain. Mouth ulcers       Review of Systems   Constitutional: Negative for appetite change, fatigue and unexpected weight change. HENT: Negative for congestion, ear pain, hearing loss, sinus pain, sore throat and trouble swallowing. Eyes: Negative for photophobia, pain, discharge and redness. Respiratory: Negative for cough, chest tightness and shortness of breath. Cardiovascular: Negative for chest pain, palpitations and leg swelling. Gastrointestinal: Negative for abdominal pain, blood in stool, diarrhea, nausea and vomiting. Endocrine: Negative. Genitourinary: Negative for dysuria, flank pain, frequency and hematuria. Musculoskeletal: Negative for arthralgias, back pain, joint swelling and myalgias. Skin: Negative. Allergic/Immunologic: Negative. Neurological: Negative for dizziness, seizures, syncope, weakness, light-headedness, numbness and headaches. Hematological: Negative for adenopathy. Does not bruise/bleed easily. Psychiatric/Behavioral: Negative. Current Outpatient Medications:     pregabalin (LYRICA) 50 MG capsule, Take 1 capsule by mouth 2 times daily for 180 days. , Disp: 180 capsule, Rfl: 1    Magic Mouthwash (MIRACLE MOUTHWASH), Swish and spit 5 mLs 2 times daily as needed for Irritation, Disp: 240 mL, Rfl: 1    tretinoin (RETIN-A) 0.025 % cream, TAKE 1 APPLICATION TOPICALLY ONCE PER DAY FOR 1 MONTH APPLY TO FACE EVERY NIGHT, Disp: , Rfl:     amphetamine-dextroamphetamine (ADDERALL) 10 MG tablet, Take 10 mg by mouth daily. , Disp: , Rfl:     brexpiprazole (REXULTI) 4 MG TABS tablet, Take 2 mg by mouth daily , Disp: , Rfl:     valACYclovir  Transportation needs     Medical: Not on file     Non-medical: Not on file   Tobacco Use    Smoking status: Never Smoker    Smokeless tobacco: Never Used   Substance and Sexual Activity    Alcohol use: Yes     Comment: rarely    Drug use: No     Comment: remote marijuana use    Sexual activity: Not on file   Lifestyle    Physical activity     Days per week: Not on file     Minutes per session: Not on file    Stress: Not on file   Relationships    Social connections     Talks on phone: Not on file     Gets together: Not on file     Attends Muslim service: Not on file     Active member of club or organization: Not on file     Attends meetings of clubs or organizations: Not on file     Relationship status: Not on file    Intimate partner violence     Fear of current or ex partner: Not on file     Emotionally abused: Not on file     Physically abused: Not on file     Forced sexual activity: Not on file   Other Topics Concern    Not on file   Social History Narrative    Not on file       Vitals:    03/11/20 1344   BP: 118/80   Pulse: 115   Temp: 96.7 °F (35.9 °C)   TempSrc: Temporal   SpO2: 97%   Weight: 208 lb 1.3 oz (94.4 kg)   Height: 5' 4.5\" (1.638 m)       Physical Exam  Vitals signs and nursing note reviewed. Constitutional:       Appearance: She is well-developed. HENT:      Head: Atraumatic. Right Ear: External ear normal.      Left Ear: External ear normal.      Nose: Nose normal.      Mouth/Throat:      Pharynx: No oropharyngeal exudate. Eyes:      Conjunctiva/sclera: Conjunctivae normal.      Pupils: Pupils are equal, round, and reactive to light. Neck:      Musculoskeletal: Normal range of motion and neck supple. Thyroid: No thyromegaly. Trachea: No tracheal deviation. Cardiovascular:      Rate and Rhythm: Normal rate and regular rhythm. Heart sounds: No murmur. No friction rub. No gallop.     Pulmonary:      Effort: Pulmonary effort is normal. No respiratory distress. Breath sounds: Normal breath sounds. Abdominal:      General: Bowel sounds are normal.      Palpations: Abdomen is soft. Musculoskeletal: Normal range of motion. General: Tenderness present. No deformity. Comments: Legs   Lymphadenopathy:      Cervical: No cervical adenopathy. Skin:     General: Skin is warm and dry. Capillary Refill: Capillary refill takes less than 2 seconds. Findings: No rash. Neurological:      Mental Status: She is alert and oriented to person, place, and time. Sensory: No sensory deficit. Motor: No abnormal muscle tone. Coordination: Coordination normal.      Deep Tendon Reflexes: Reflexes normal.         Assessment and Plan:  Shannan Ferris was seen today for irritable bowel syndrome. Diagnoses and all orders for this visit:    Aphthous ulcer  -     Magic Mouthwash (MIRACLE MOUTHWASH); Swish and spit 5 mLs 2 times daily as needed for Irritation    Irritable bowel syndrome with both constipation and diarrhea    Pain in both lower extremities  -     pregabalin (LYRICA) 50 MG capsule; Take 1 capsule by mouth 2 times daily for 180 days. Return in about 3 months (around 6/11/2020).       Seen By:  Juan Miguel Lopes DO

## 2020-10-12 RX ORDER — PREGABALIN 50 MG/1
50 CAPSULE ORAL 2 TIMES DAILY
Qty: 180 CAPSULE | Refills: 1 | OUTPATIENT
Start: 2020-10-12 | End: 2021-04-10

## 2020-10-12 NOTE — TELEPHONE ENCOUNTER
Last Appointment:  3/11/2020  No future appointments.      Patient asking [facility]

## 2020-10-13 ENCOUNTER — VIRTUAL VISIT (OUTPATIENT)
Dept: FAMILY MEDICINE CLINIC | Age: 40
End: 2020-10-13
Payer: COMMERCIAL

## 2020-10-13 PROCEDURE — 99213 OFFICE O/P EST LOW 20 MIN: CPT | Performed by: FAMILY MEDICINE

## 2020-10-13 RX ORDER — LUBIPROSTONE 24 UG/1
CAPSULE, GELATIN COATED ORAL
COMMUNITY
Start: 2020-08-07 | End: 2021-10-04

## 2020-10-13 RX ORDER — PREGABALIN 50 MG/1
50 CAPSULE ORAL 2 TIMES DAILY
Qty: 180 CAPSULE | Refills: 1 | Status: SHIPPED
Start: 2020-10-13 | End: 2022-07-08 | Stop reason: ALTCHOICE

## 2020-10-13 RX ORDER — TOPIRAMATE 100 MG/1
TABLET, FILM COATED ORAL
COMMUNITY
Start: 2020-08-26

## 2020-10-13 RX ORDER — HYDROXYZINE PAMOATE 50 MG/1
CAPSULE ORAL
COMMUNITY
Start: 2020-07-13 | End: 2021-10-04

## 2020-10-13 RX ORDER — ARIPIPRAZOLE 15 MG/1
15 TABLET ORAL DAILY
COMMUNITY

## 2020-10-13 RX ORDER — CLONIDINE HYDROCHLORIDE 0.2 MG/1
0.2 TABLET ORAL
COMMUNITY
Start: 2020-08-28

## 2020-10-13 ASSESSMENT — ENCOUNTER SYMPTOMS
SHORTNESS OF BREATH: 0
VOMITING: 0
BACK PAIN: 0
PHOTOPHOBIA: 0
DIARRHEA: 0
ALLERGIC/IMMUNOLOGIC NEGATIVE: 1
EYE PAIN: 0
SORE THROAT: 0
NAUSEA: 0
EYE DISCHARGE: 0
ABDOMINAL PAIN: 0
TROUBLE SWALLOWING: 0
SINUS PAIN: 0
BLOOD IN STOOL: 0
COUGH: 0
EYE REDNESS: 0
CHEST TIGHTNESS: 0

## 2021-03-25 LAB
BASOPHILS ABSOLUTE: 0.04 E9/L (ref 0–0.2)
BASOPHILS RELATIVE PERCENT: 0.8 % (ref 0–2)
C-REACTIVE PROTEIN: 0.3 MG/DL (ref 0–0.4)
EOSINOPHILS ABSOLUTE: 0.06 E9/L (ref 0.05–0.5)
EOSINOPHILS RELATIVE PERCENT: 1.2 % (ref 0–6)
HCT VFR BLD CALC: 44.1 % (ref 34–48)
HEMOGLOBIN: 14.6 G/DL (ref 11.5–15.5)
IMMATURE GRANULOCYTES #: 0.02 E9/L
IMMATURE GRANULOCYTES %: 0.4 % (ref 0–5)
LYMPHOCYTES ABSOLUTE: 1.51 E9/L (ref 1.5–4)
LYMPHOCYTES RELATIVE PERCENT: 31.1 % (ref 20–42)
MCH RBC QN AUTO: 31.6 PG (ref 26–35)
MCHC RBC AUTO-ENTMCNC: 33.1 % (ref 32–34.5)
MCV RBC AUTO: 95.5 FL (ref 80–99.9)
MONOCYTES ABSOLUTE: 0.43 E9/L (ref 0.1–0.95)
MONOCYTES RELATIVE PERCENT: 8.8 % (ref 2–12)
NEUTROPHILS ABSOLUTE: 2.8 E9/L (ref 1.8–7.3)
NEUTROPHILS RELATIVE PERCENT: 57.7 % (ref 43–80)
PDW BLD-RTO: 14.3 FL (ref 11.5–15)
PLATELET # BLD: 203 E9/L (ref 130–450)
PMV BLD AUTO: 12.1 FL (ref 7–12)
RBC # BLD: 4.62 E12/L (ref 3.5–5.5)
TSH SERPL DL<=0.05 MIU/L-ACNC: 1.77 UIU/ML (ref 0.27–4.2)
WBC # BLD: 4.9 E9/L (ref 4.5–11.5)

## 2021-03-26 LAB — IGA: 104 MG/DL (ref 70–400)

## 2021-03-29 LAB — TISSUE TRANSGLUTAMINASE IGA: 0.2 U/ML

## 2021-10-04 ENCOUNTER — TELEPHONE (OUTPATIENT)
Dept: FAMILY MEDICINE CLINIC | Age: 41
End: 2021-10-04

## 2021-10-04 ENCOUNTER — OFFICE VISIT (OUTPATIENT)
Dept: FAMILY MEDICINE CLINIC | Age: 41
End: 2021-10-04
Payer: COMMERCIAL

## 2021-10-04 VITALS
HEIGHT: 65 IN | HEART RATE: 78 BPM | RESPIRATION RATE: 16 BRPM | OXYGEN SATURATION: 100 % | DIASTOLIC BLOOD PRESSURE: 80 MMHG | BODY MASS INDEX: 36.65 KG/M2 | SYSTOLIC BLOOD PRESSURE: 102 MMHG | TEMPERATURE: 97.5 F | WEIGHT: 220 LBS

## 2021-10-04 DIAGNOSIS — K58.1 IRRITABLE BOWEL SYNDROME WITH CONSTIPATION: ICD-10-CM

## 2021-10-04 DIAGNOSIS — Z23 NEED FOR VACCINATION: Primary | ICD-10-CM

## 2021-10-04 PROCEDURE — G8417 CALC BMI ABV UP PARAM F/U: HCPCS | Performed by: FAMILY MEDICINE

## 2021-10-04 PROCEDURE — 90471 IMMUNIZATION ADMIN: CPT | Performed by: FAMILY MEDICINE

## 2021-10-04 PROCEDURE — 99213 OFFICE O/P EST LOW 20 MIN: CPT | Performed by: FAMILY MEDICINE

## 2021-10-04 PROCEDURE — G8427 DOCREV CUR MEDS BY ELIG CLIN: HCPCS | Performed by: FAMILY MEDICINE

## 2021-10-04 PROCEDURE — 1036F TOBACCO NON-USER: CPT | Performed by: FAMILY MEDICINE

## 2021-10-04 PROCEDURE — 90674 CCIIV4 VAC NO PRSV 0.5 ML IM: CPT | Performed by: FAMILY MEDICINE

## 2021-10-04 PROCEDURE — G8482 FLU IMMUNIZE ORDER/ADMIN: HCPCS | Performed by: FAMILY MEDICINE

## 2021-10-04 RX ORDER — LISDEXAMFETAMINE DIMESYLATE 50 MG
CAPSULE ORAL
COMMUNITY
Start: 2021-09-10

## 2021-10-04 RX ORDER — LINACLOTIDE 72 UG/1
CAPSULE, GELATIN COATED ORAL
COMMUNITY
Start: 2021-08-25

## 2021-10-04 RX ORDER — TRAZODONE HYDROCHLORIDE 100 MG/1
TABLET ORAL
COMMUNITY
Start: 2021-09-27

## 2021-10-04 SDOH — ECONOMIC STABILITY: FOOD INSECURITY: WITHIN THE PAST 12 MONTHS, YOU WORRIED THAT YOUR FOOD WOULD RUN OUT BEFORE YOU GOT MONEY TO BUY MORE.: NEVER TRUE

## 2021-10-04 SDOH — ECONOMIC STABILITY: FOOD INSECURITY: WITHIN THE PAST 12 MONTHS, THE FOOD YOU BOUGHT JUST DIDN'T LAST AND YOU DIDN'T HAVE MONEY TO GET MORE.: NEVER TRUE

## 2021-10-04 ASSESSMENT — ENCOUNTER SYMPTOMS
VOMITING: 0
ALLERGIC/IMMUNOLOGIC NEGATIVE: 1
SORE THROAT: 0
EYE PAIN: 0
NAUSEA: 0
EYE REDNESS: 0
COUGH: 0
EYE DISCHARGE: 0
BACK PAIN: 0
DIARRHEA: 0
TROUBLE SWALLOWING: 0
BLOOD IN STOOL: 0
CHEST TIGHTNESS: 0
PHOTOPHOBIA: 0
SINUS PAIN: 0
SHORTNESS OF BREATH: 0
ABDOMINAL PAIN: 0

## 2021-10-04 ASSESSMENT — SOCIAL DETERMINANTS OF HEALTH (SDOH): HOW HARD IS IT FOR YOU TO PAY FOR THE VERY BASICS LIKE FOOD, HOUSING, MEDICAL CARE, AND HEATING?: NOT HARD AT ALL

## 2021-10-04 NOTE — PROGRESS NOTES
10/4/21  Candace Mejias : 1980 Sex: female  Age: 36 y.o. Assessment and Plan:  Raquel Dodson was seen today for other. Diagnoses and all orders for this visit:    Need for vaccination  -     INFLUENZA, MDCK QUADV, 2 YRS AND OLDER, IM, PF, PREFILL SYR OR SDV, 0.5ML (FLUCELVAX QUADV, PF)    Irritable bowel syndrome with constipation  Doing well with current treatment. Return in about 3 months (around 2022), or Recheck for fasting blood work. Chief Complaint   Patient presents with   [facility] Other     wants flu shot        Here for recheck visit. Doing well on her current medications with minimal GI effects. Mood also stable. Like flu shot. Review of Systems   Constitutional: Negative for appetite change, fatigue and unexpected weight change. HENT: Negative for congestion, ear pain, hearing loss, sinus pain, sore throat and trouble swallowing. Eyes: Negative for photophobia, pain, discharge and redness. Respiratory: Negative for cough, chest tightness and shortness of breath. Cardiovascular: Negative for chest pain, palpitations and leg swelling. Gastrointestinal: Negative for abdominal pain, blood in stool, diarrhea, nausea and vomiting. Endocrine: Negative. Genitourinary: Negative for dysuria, flank pain, frequency and hematuria. Musculoskeletal: Negative for arthralgias, back pain, joint swelling and myalgias. Skin: Negative. Allergic/Immunologic: Negative. Neurological: Negative for dizziness, seizures, syncope, weakness, light-headedness, numbness and headaches. Hematological: Negative for adenopathy. Does not bruise/bleed easily. Psychiatric/Behavioral: Negative.           Current Outpatient Medications:     LINZESS 290 MCG CAPS capsule, TAKE 1 CAPSULE BY MOUTH EVERY DAY, Disp: , Rfl:     VYVANSE 50 MG capsule, TAKE 1 CAPSULE BY MOUTH EVERY MORNING, Disp: , Rfl:     topiramate (TOPAMAX) 100 MG tablet, TAKE 1 TABLET BY MOUTH THREE TIMES A DAY, Disp: , Rfl:     cloNIDine (CATAPRES) 0.2 MG tablet, 0.2 mg 2 po @HS, Disp: , Rfl:     ARIPiprazole (ABILIFY) 15 MG tablet, Take 15 mg by mouth daily , Disp: , Rfl:     tretinoin (RETIN-A) 0.025 % cream, TAKE 1 APPLICATION TOPICALLY ONCE PER DAY FOR 1 MONTH APPLY TO FACE EVERY NIGHT, Disp: , Rfl:     fluvoxaMINE (LUVOX) 100 MG tablet, Take 150 mg by mouth nightly 3 po qd at hs , Disp: , Rfl:     Potassium Citrate ER 15 MEQ (1620 MG) TBCR, Take 15 mEq by mouth 4 tabs po qd, Disp: , Rfl:     traZODone (DESYREL) 100 MG tablet, TAKE 1 TABLET BY MOUTH EVERY DAY AT BEDTIME AS NEEDED, Disp: , Rfl:     pregabalin (LYRICA) 50 MG capsule, Take 1 capsule by mouth 2 times daily for 180 days.  (Patient not taking: Reported on 10/4/2021), Disp: 180 capsule, Rfl: 1  Allergies   Allergen Reactions    Latex Other (See Comments) and Rash     Other reaction(s): Makes skin bleed  On contact      Cefaclor     Penicillin G     Penicillins     Seasonal Other (See Comments)    Sulfa Antibiotics        Past Medical History:   Diagnosis Date    ADHD     Anxiety     Depression     GERD (gastroesophageal reflux disease)     Granuloma annulare     Kidney stone     Migraine      Past Surgical History:   Procedure Laterality Date    FINGER SURGERY Left 08/05/2015    Dr. Estuardo Garnett, ring finger; path report = granuloma annulare    TONSILLECTOMY       Family History   Problem Relation Age of Onset    Uterine Cancer Mother     Thyroid Disease Mother     Osteoporosis Mother     No Known Problems Father      Social History     Socioeconomic History    Marital status:      Spouse name: Not on file    Number of children: Not on file    Years of education: Not on file    Highest education level: Not on file   Occupational History    Occupation: previous phlebotomist, teacher   Tobacco Use    Smoking status: Never Smoker    Smokeless tobacco: Never Used   Vaping Use    Vaping Use: Never used   Substance and Sexual Activity    Alcohol use: Yes     Comment: rarely    Drug use: No     Comment: remote marijuana use    Sexual activity: Not on file   Other Topics Concern    Not on file   Social History Narrative    Not on file     Social Determinants of Health     Financial Resource Strain: Low Risk     Difficulty of Paying Living Expenses: Not hard at all   Food Insecurity: No Food Insecurity    Worried About [address] in the Last Year: Never true    [address] in the Last Year: Never true   Transportation Needs:     Lack of Transportation (Medical):  Lack of Transportation (Non-Medical):    Physical Activity:     Days of Exercise per Week:     Minutes of Exercise per Session:    Stress:     Feeling of Stress :    Social Connections:     Frequency of Communication with Friends and Family:     Frequency of Social Gatherings with Friends and Family:     Attends Taoism Services:     Active Member of Clubs or Organizations:     Attends Club or Organization Meetings:     Marital Status:    Intimate Partner Violence:     Fear of Current or Ex-Partner:     Emotionally Abused:     Physically Abused:     Sexually Abused:        Vitals:    10/04/21 1441   BP: 102/80   Pulse: 78   Resp: 16   Temp: 97.5 °F (36.4 °C)   TempSrc: Temporal   SpO2: 100%   Weight: 220 lb (99.8 kg)   Height: 5' 4.5\" (1.638 m)       Physical Exam  Vitals and nursing note reviewed. Constitutional:       Appearance: She is well-developed. HENT:      Head: Atraumatic. Right Ear: External ear normal.      Left Ear: External ear normal.      Nose: Nose normal.      Mouth/Throat:      Pharynx: No oropharyngeal exudate. Eyes:      Conjunctiva/sclera: Conjunctivae normal.      Pupils: Pupils are equal, round, and reactive to light. Neck:      Thyroid: No thyromegaly. Trachea: No tracheal deviation. Cardiovascular:      Rate and Rhythm: Normal rate and regular rhythm. Heart sounds: No murmur heard.    No

## 2021-10-04 NOTE — TELEPHONE ENCOUNTER
----- Message from Miesha Christi sent at 10/4/2021  1:44 PM EDT -----  Subject: Appointment Request    Reason for Call: Flu Shot    QUESTIONS  Type of Appointment? Established Patient  Reason for appointment request? No appointments available during search  Additional Information for Provider? Pt called in to book a flu shot appt. No appts were available in my system. Attempted to WT to practice to get   her scheduled but no answer. Pt would like a call back to book flu shot.   ---------------------------------------------------------------------------  --------------  CALL BACK INFO  What is the best way for the office to contact you? OK to leave message on   voicemail  Preferred Call Back Phone Number? [phone]  ---------------------------------------------------------------------------  --------------  SCRIPT ANSWERS  Relationship to Patient? Self   Is the Adult patient requesting a Flu Shot? Yes  Is the parent/patient requesting a Flu Shot for a child older than 6   months? No  Does the patient have a question for their provider regarding the flu   shot? No  Have you been diagnosed with, awaiting test results for, or told that you   are suspected of having COVID-19 (Coronavirus)? (If patient has tested   negative or was tested as a requirement for work, school, or travel and   not based on symptoms, answer no)? No  Within the past two weeks have you developed any of the following symptoms   (answer no if symptoms have been present longer than 2 weeks or began   more than 2 weeks ago)? Fever or Chills, Cough, Shortness of breath or   difficulty breathing, Loss of taste or smell, Sore throat, Nasal   congestion, Sneezing or runny nose, Fatigue or generalized body aches   (answer no if pain is specific to a body part e.g. back pain), Diarrhea,   Headache? No  Have you had close contact with someone with COVID-19 in the last 14 days?    No  (Service Expert  click yes below to proceed with [employer] As Usual Scheduling)?  Yes

## 2022-01-05 ENCOUNTER — OFFICE VISIT (OUTPATIENT)
Dept: FAMILY MEDICINE CLINIC | Age: 42
End: 2022-01-05
Payer: COMMERCIAL

## 2022-01-05 VITALS
BODY MASS INDEX: 37.65 KG/M2 | HEIGHT: 65 IN | SYSTOLIC BLOOD PRESSURE: 108 MMHG | RESPIRATION RATE: 16 BRPM | WEIGHT: 226 LBS | TEMPERATURE: 96.9 F | OXYGEN SATURATION: 100 % | DIASTOLIC BLOOD PRESSURE: 84 MMHG | HEART RATE: 86 BPM

## 2022-01-05 DIAGNOSIS — Z00.01 ENCOUNTER FOR GENERAL ADULT MEDICAL EXAMINATION WITH ABNORMAL FINDINGS: Primary | ICD-10-CM

## 2022-01-05 PROCEDURE — G8482 FLU IMMUNIZE ORDER/ADMIN: HCPCS | Performed by: FAMILY MEDICINE

## 2022-01-05 PROCEDURE — 99396 PREV VISIT EST AGE 40-64: CPT | Performed by: FAMILY MEDICINE

## 2022-01-05 ASSESSMENT — ENCOUNTER SYMPTOMS
SORE THROAT: 0
VOMITING: 0
SINUS PAIN: 0
EYE REDNESS: 0
EYE PAIN: 0
PHOTOPHOBIA: 0
SHORTNESS OF BREATH: 0
EYE DISCHARGE: 0
DIARRHEA: 0
TROUBLE SWALLOWING: 0
COUGH: 0
BACK PAIN: 0
ALLERGIC/IMMUNOLOGIC NEGATIVE: 1
NAUSEA: 0
BLOOD IN STOOL: 0
ABDOMINAL PAIN: 0
CHEST TIGHTNESS: 0

## 2022-01-05 ASSESSMENT — PATIENT HEALTH QUESTIONNAIRE - PHQ9
1. LITTLE INTEREST OR PLEASURE IN DOING THINGS: 0
SUM OF ALL RESPONSES TO PHQ QUESTIONS 1-9: 0
SUM OF ALL RESPONSES TO PHQ QUESTIONS 1-9: 0
SUM OF ALL RESPONSES TO PHQ9 QUESTIONS 1 & 2: 0
SUM OF ALL RESPONSES TO PHQ QUESTIONS 1-9: 0
2. FEELING DOWN, DEPRESSED OR HOPELESS: 0
1. LITTLE INTEREST OR PLEASURE IN DOING THINGS: 0
SUM OF ALL RESPONSES TO PHQ QUESTIONS 1-9: 0
SUM OF ALL RESPONSES TO PHQ QUESTIONS 1-9: 0
SUM OF ALL RESPONSES TO PHQ9 QUESTIONS 1 & 2: 0
SUM OF ALL RESPONSES TO PHQ QUESTIONS 1-9: 0
SUM OF ALL RESPONSES TO PHQ QUESTIONS 1-9: 0
2. FEELING DOWN, DEPRESSED OR HOPELESS: 0
SUM OF ALL RESPONSES TO PHQ QUESTIONS 1-9: 0

## 2022-01-05 ASSESSMENT — LIFESTYLE VARIABLES
HOW OFTEN DO YOU HAVE A DRINK CONTAINING ALCOHOL: 2-3 TIMES A WEEK
HOW MANY STANDARD DRINKS CONTAINING ALCOHOL DO YOU HAVE ON A TYPICAL DAY: 1 OR 2

## 2022-01-05 NOTE — PROGRESS NOTES
22  Teetee Coates : 1980 Sex: female  Age: 39 y.o. Assessment and Plan:  Imelda Souza was seen today for blood work. Diagnoses and all orders for this visit:    Encounter for general adult medical examination with abnormal findings  -     CBC Auto Differential; Future  -     Comprehensive Metabolic Panel, Fasting; Future  -     Lipid Panel; Future  -     TSH without Reflex; Future  -     Urinalysis; Future        Return in about 6 months (around 2022). Chief Complaint   Patient presents with    Blood Work     Follow up        Patient is here for annual physical.  She is a [facility] for GI issues and a psychiatrist for her depression. She has been doing well, denies any new voiced complaints. He is overdue for fasting blood work, will order today. Review of Systems   Constitutional: Negative for appetite change, fatigue and unexpected weight change. HENT: Negative for congestion, ear pain, hearing loss, sinus pain, sore throat and trouble swallowing. Eyes: Negative for photophobia, pain, discharge and redness. Respiratory: Negative for cough, chest tightness and shortness of breath. Cardiovascular: Negative for chest pain, palpitations and leg swelling. Gastrointestinal: Negative for abdominal pain, blood in stool, diarrhea, nausea and vomiting. Endocrine: Negative. Genitourinary: Negative for dysuria, flank pain, frequency and hematuria. Musculoskeletal: Negative for arthralgias, back pain, joint swelling and myalgias. Skin: Negative. Allergic/Immunologic: Negative. Neurological: Negative for dizziness, seizures, syncope, weakness, light-headedness, numbness and headaches. Hematological: Negative for adenopathy. Does not bruise/bleed easily. Psychiatric/Behavioral: Negative.           Current Outpatient Medications:     LINZESS 290 MCG CAPS capsule, TAKE 1 CAPSULE BY MOUTH EVERY DAY, Disp: , Rfl:     VYVANSE 50 MG capsule, TAKE 1 CAPSULE BY MOUTH EVERY MORNING, Disp: , Rfl:     traZODone (DESYREL) 100 MG tablet, TAKE 1 TABLET BY MOUTH EVERY DAY AT BEDTIME AS NEEDED, Disp: , Rfl:     topiramate (TOPAMAX) 100 MG tablet, TAKE 1 TABLET BY MOUTH THREE TIMES A DAY, Disp: , Rfl:     cloNIDine (CATAPRES) 0.2 MG tablet, 0.2 mg 2 po @HS, Disp: , Rfl:     ARIPiprazole (ABILIFY) 15 MG tablet, Take 15 mg by mouth daily , Disp: , Rfl:     tretinoin (RETIN-A) 0.025 % cream, TAKE 1 APPLICATION TOPICALLY ONCE PER DAY FOR 1 MONTH APPLY TO FACE EVERY NIGHT, Disp: , Rfl:     fluvoxaMINE (LUVOX) 100 MG tablet, Take 150 mg by mouth nightly 3 po qd at hs , Disp: , Rfl:     Potassium Citrate ER 15 MEQ (1620 MG) TBCR, Take 15 mEq by mouth 4 tabs po qd, Disp: , Rfl:     pregabalin (LYRICA) 50 MG capsule, Take 1 capsule by mouth 2 times daily for 180 days.  (Patient not taking: Reported on 10/4/2021), Disp: 180 capsule, Rfl: 1  Allergies   Allergen Reactions    Latex Other (See Comments) and Rash     Other reaction(s): Makes skin bleed  On contact      Cefaclor     Penicillin G     Penicillins     Seasonal Other (See Comments)    Sulfa Antibiotics        Past Medical History:   Diagnosis Date    ADHD     Anxiety     Depression     GERD (gastroesophageal reflux disease)     Granuloma annulare     Kidney stone     Migraine      Past Surgical History:   Procedure Laterality Date    FINGER SURGERY Left 08/05/2015    Dr. Kassie Pina, ring finger; path report = granuloma annulare    TONSILLECTOMY       Family History   Problem Relation Age of Onset    Uterine Cancer Mother     Thyroid Disease Mother     Osteoporosis Mother     No Known Problems Father      Social History     Socioeconomic History    Marital status:      Spouse name: Not on file    Number of children: Not on file    Years of education: Not on file    Highest education level: Not on file   Occupational History    Occupation: previous phlebotomist, teacher   Tobacco Use    Smoking status: Never Smoker    Smokeless tobacco: Never Used   Vaping Use    Vaping Use: Never used   Substance and Sexual Activity    Alcohol use: Yes     Comment: rarely    Drug use: No     Comment: remote marijuana use    Sexual activity: Not on file   Other Topics Concern    Not on file   Social History Narrative    Not on file     Social Determinants of Health     Financial Resource Strain: Low Risk     Difficulty of Paying Living Expenses: Not hard at all   Food Insecurity: No Food Insecurity    Worried About [address] in the Last Year: Never true    [facility] in the Last Year: Never true   Transportation Needs:     Lack of Transportation (Medical): Not on file    Lack of Transportation (Non-Medical): Not on file   Physical Activity:     Days of Exercise per Week: Not on file    Minutes of Exercise per Session: Not on file   Stress:     Feeling of Stress : Not on file   Social Connections:     Frequency of Communication with Friends and Family: Not on file    Frequency of Social Gatherings with Friends and Family: Not on file    Attends Taoist Services: Not on file    Active Member of [address] [place] [address] or Organizations: Not on file    Attends Club or Organization Meetings: Not on file    Marital Status: Not on file   Intimate Partner Violence:     Fear of Current or Ex-Partner: Not on file    Emotionally Abused: Not on file    Physically Abused: Not on file    Sexually Abused: Not on file   Housing Stability:     Unable to Pay for Housing in the Last Year: Not on file    Number of [place] in the Last Year: Not on file    Unstable Housing in the Last Year: Not on file       Vitals:    01/05/22 1433   BP: 108/84   Pulse: 86   Resp: 16   Temp: 96.9 °F (36.1 °C)   TempSrc: Temporal   SpO2: 100%   Weight: 226 lb (102.5 kg)   Height: 5' 4.5\" (1.638 m)       Physical Exam  Vitals and nursing note reviewed. Constitutional:       Appearance: She is well-developed.    HENT:      Head: Atraumatic. Right Ear: External ear normal.      Left Ear: External ear normal.      Nose: Nose normal.      Mouth/Throat:      Pharynx: No oropharyngeal exudate. Eyes:      Conjunctiva/sclera: Conjunctivae normal.      Pupils: Pupils are equal, round, and reactive to light. Neck:      Thyroid: No thyromegaly. Trachea: No tracheal deviation. Cardiovascular:      Rate and Rhythm: Normal rate and regular rhythm. Heart sounds: No murmur heard. No friction rub. No gallop. Pulmonary:      Effort: Pulmonary effort is normal. No respiratory distress. Breath sounds: Normal breath sounds. Abdominal:      General: Bowel sounds are normal.      Palpations: Abdomen is soft. Musculoskeletal:         General: No tenderness or deformity. Normal range of motion. Cervical back: Normal range of motion and neck supple. Lymphadenopathy:      Cervical: No cervical adenopathy. Skin:     General: Skin is warm and dry. Capillary Refill: Capillary refill takes less than 2 seconds. Findings: No rash. Neurological:      Mental Status: She is alert and oriented to person, place, and time. Sensory: No sensory deficit. Motor: No abnormal muscle tone.       Coordination: Coordination normal.      Deep Tendon Reflexes: Reflexes normal.             Seen By:  Court Painter, DO

## 2022-02-18 DIAGNOSIS — Z00.01 ENCOUNTER FOR GENERAL ADULT MEDICAL EXAMINATION WITH ABNORMAL FINDINGS: ICD-10-CM

## 2022-02-18 LAB
ALBUMIN SERPL-MCNC: 4.4 G/DL (ref 3.5–5.2)
ALP BLD-CCNC: 96 U/L (ref 35–104)
ALT SERPL-CCNC: 12 U/L (ref 0–32)
AMORPHOUS: ABNORMAL
ANION GAP SERPL CALCULATED.3IONS-SCNC: 12 MMOL/L (ref 7–16)
AST SERPL-CCNC: 24 U/L (ref 0–31)
BACTERIA: ABNORMAL /HPF
BASOPHILS ABSOLUTE: 0.05 E9/L (ref 0–0.2)
BASOPHILS RELATIVE PERCENT: 0.7 % (ref 0–2)
BILIRUB SERPL-MCNC: 0.3 MG/DL (ref 0–1.2)
BILIRUBIN URINE: NEGATIVE
BLOOD, URINE: NEGATIVE
BUN BLDV-MCNC: 15 MG/DL (ref 6–20)
CALCIUM SERPL-MCNC: 9.7 MG/DL (ref 8.6–10.2)
CHLORIDE BLD-SCNC: 108 MMOL/L (ref 98–107)
CHOLESTEROL, TOTAL: 178 MG/DL (ref 0–199)
CLARITY: ABNORMAL
CO2: 19 MMOL/L (ref 22–29)
COLOR: YELLOW
CREAT SERPL-MCNC: 1.1 MG/DL (ref 0.5–1)
EOSINOPHILS ABSOLUTE: 0.16 E9/L (ref 0.05–0.5)
EOSINOPHILS RELATIVE PERCENT: 2.4 % (ref 0–6)
EPITHELIAL CELLS, UA: ABNORMAL /HPF
GFR AFRICAN AMERICAN: >60
GFR NON-AFRICAN AMERICAN: 55 ML/MIN/1.73
GLUCOSE FASTING: 90 MG/DL (ref 74–99)
GLUCOSE URINE: NEGATIVE MG/DL
HCT VFR BLD CALC: 45 % (ref 34–48)
HDLC SERPL-MCNC: 65 MG/DL
HEMOGLOBIN: 14.7 G/DL (ref 11.5–15.5)
IMMATURE GRANULOCYTES #: 0.02 E9/L
IMMATURE GRANULOCYTES %: 0.3 % (ref 0–5)
KETONES, URINE: NEGATIVE MG/DL
LDL CHOLESTEROL CALCULATED: 97 MG/DL (ref 0–99)
LEUKOCYTE ESTERASE, URINE: ABNORMAL
LYMPHOCYTES ABSOLUTE: 2.3 E9/L (ref 1.5–4)
LYMPHOCYTES RELATIVE PERCENT: 34 % (ref 20–42)
MCH RBC QN AUTO: 31.4 PG (ref 26–35)
MCHC RBC AUTO-ENTMCNC: 32.7 % (ref 32–34.5)
MCV RBC AUTO: 96.2 FL (ref 80–99.9)
MONOCYTES ABSOLUTE: 0.53 E9/L (ref 0.1–0.95)
MONOCYTES RELATIVE PERCENT: 7.8 % (ref 2–12)
NEUTROPHILS ABSOLUTE: 3.71 E9/L (ref 1.8–7.3)
NEUTROPHILS RELATIVE PERCENT: 54.8 % (ref 43–80)
NITRITE, URINE: NEGATIVE
PDW BLD-RTO: 13.1 FL (ref 11.5–15)
PH UA: 8.5 (ref 5–9)
PLATELET # BLD: 199 E9/L (ref 130–450)
PMV BLD AUTO: 11.5 FL (ref 7–12)
POTASSIUM SERPL-SCNC: 4.4 MMOL/L (ref 3.5–5)
PROTEIN UA: NEGATIVE MG/DL
RBC # BLD: 4.68 E12/L (ref 3.5–5.5)
RBC UA: ABNORMAL /HPF (ref 0–2)
SODIUM BLD-SCNC: 139 MMOL/L (ref 132–146)
SPECIFIC GRAVITY UA: 1.01 (ref 1–1.03)
TOTAL PROTEIN: 6.9 G/DL (ref 6.4–8.3)
TRIGL SERPL-MCNC: 79 MG/DL (ref 0–149)
TSH SERPL DL<=0.05 MIU/L-ACNC: 1.19 UIU/ML (ref 0.27–4.2)
UROBILINOGEN, URINE: 0.2 E.U./DL
VLDLC SERPL CALC-MCNC: 16 MG/DL
WBC # BLD: 6.8 E9/L (ref 4.5–11.5)
WBC UA: ABNORMAL /HPF (ref 0–5)

## 2022-03-21 ENCOUNTER — OFFICE VISIT (OUTPATIENT)
Dept: FAMILY MEDICINE CLINIC | Age: 42
End: 2022-03-21
Payer: COMMERCIAL

## 2022-03-21 VITALS
RESPIRATION RATE: 18 BRPM | DIASTOLIC BLOOD PRESSURE: 82 MMHG | BODY MASS INDEX: 38.15 KG/M2 | HEIGHT: 65 IN | SYSTOLIC BLOOD PRESSURE: 110 MMHG | TEMPERATURE: 97.1 F | HEART RATE: 86 BPM | WEIGHT: 229 LBS | OXYGEN SATURATION: 98 %

## 2022-03-21 DIAGNOSIS — J40 BRONCHITIS: Primary | ICD-10-CM

## 2022-03-21 LAB
Lab: NORMAL
QC PASS/FAIL: NORMAL
SARS-COV-2 RDRP RESP QL NAA+PROBE: NEGATIVE

## 2022-03-21 PROCEDURE — G8482 FLU IMMUNIZE ORDER/ADMIN: HCPCS | Performed by: FAMILY MEDICINE

## 2022-03-21 PROCEDURE — G8427 DOCREV CUR MEDS BY ELIG CLIN: HCPCS | Performed by: FAMILY MEDICINE

## 2022-03-21 PROCEDURE — G8417 CALC BMI ABV UP PARAM F/U: HCPCS | Performed by: FAMILY MEDICINE

## 2022-03-21 PROCEDURE — 87635 SARS-COV-2 COVID-19 AMP PRB: CPT | Performed by: FAMILY MEDICINE

## 2022-03-21 PROCEDURE — 1036F TOBACCO NON-USER: CPT | Performed by: FAMILY MEDICINE

## 2022-03-21 PROCEDURE — 99213 OFFICE O/P EST LOW 20 MIN: CPT | Performed by: FAMILY MEDICINE

## 2022-03-21 RX ORDER — AZITHROMYCIN 250 MG/1
250 TABLET, FILM COATED ORAL SEE ADMIN INSTRUCTIONS
Qty: 6 TABLET | Refills: 0 | Status: SHIPPED | OUTPATIENT
Start: 2022-03-21 | End: 2022-03-26

## 2022-03-21 RX ORDER — PREDNISONE 10 MG/1
TABLET ORAL
Qty: 18 TABLET | Refills: 0 | Status: SHIPPED | OUTPATIENT
Start: 2022-03-21 | End: 2022-03-30

## 2022-03-21 ASSESSMENT — ENCOUNTER SYMPTOMS
PHOTOPHOBIA: 0
SHORTNESS OF BREATH: 0
BACK PAIN: 0
ABDOMINAL PAIN: 0
SINUS PAIN: 0
NAUSEA: 0
EYE REDNESS: 0
COUGH: 1
ALLERGIC/IMMUNOLOGIC NEGATIVE: 1
EYE PAIN: 0
EYE DISCHARGE: 0
VOMITING: 0
CHEST TIGHTNESS: 0
BLOOD IN STOOL: 0
DIARRHEA: 0
SORE THROAT: 0
TROUBLE SWALLOWING: 0

## 2022-03-21 NOTE — PROGRESS NOTES
3/21/22  Syeda Tran : 1980 Sex: female  Age: 39 y.o. Assessment and Plan:  Garett Ghosh was seen today for cough. Diagnoses and all orders for this visit:    Bronchitis  -     POCT COVID-19 Rapid, NAAT  -     azithromycin (ZITHROMAX) 250 MG tablet; Take 1 tablet by mouth See Admin Instructions for 5 days 500mg on day 1 followed by 250mg on days 2 - 5  -     predniSONE (DELTASONE) 10 MG tablet; Take 3 tablets by mouth daily for 3 days, THEN 2 tablets daily for 3 days, THEN 1 tablet daily for 3 days. MDM: Rapid PCR for Covid was negative. Symptomatic treatment including Tylenol, fluids, rest, cool mist, Mucinex DM, Claritin. We will go ahead and treat with a Z-Edvin and a prednisone taper for bronchitis. He is to recheck in 3 to 5 days if complaints or not improving, or worsen in any way. Return Recheck in 3 to 5 days if not improved. Chief Complaint   Patient presents with    Cough     patient states that she has had a deep cough since sat night. . Patient states that she feels it deep in her chest..        Congestion, pressure, drainage, facial tenderness, mild headache, cough, onset 3 days ago. Denies fever, chills, diaphoresis, nausea, vomiting, decreased oral intake. Denies other GI or  complaints. OTC treatments minimally effective. Review of Systems   Constitutional: Negative for appetite change, fatigue and unexpected weight change. HENT: Positive for congestion. Negative for ear pain, hearing loss, sinus pain, sore throat and trouble swallowing. Eyes: Negative for photophobia, pain, discharge and redness. Respiratory: Positive for cough. Negative for chest tightness and shortness of breath. Cardiovascular: Negative for chest pain, palpitations and leg swelling. Gastrointestinal: Negative for abdominal pain, blood in stool, diarrhea, nausea and vomiting. Endocrine: Negative. Genitourinary: Negative for dysuria, flank pain, frequency and hematuria. Musculoskeletal: Negative for arthralgias, back pain, joint swelling and myalgias. Skin: Negative. Allergic/Immunologic: Negative. Neurological: Negative for dizziness, seizures, syncope, weakness, light-headedness, numbness and headaches. Hematological: Negative for adenopathy. Does not bruise/bleed easily. Psychiatric/Behavioral: Negative. Current Outpatient Medications:     topiramate (TOPAMAX) 200 MG tablet, TAKE 1 TABLET BY MOUTH EVERYDAY AT BEDTIME, Disp: , Rfl:     azithromycin (ZITHROMAX) 250 MG tablet, Take 1 tablet by mouth See Admin Instructions for 5 days 500mg on day 1 followed by 250mg on days 2 - 5, Disp: 6 tablet, Rfl: 0    predniSONE (DELTASONE) 10 MG tablet, Take 3 tablets by mouth daily for 3 days, THEN 2 tablets daily for 3 days, THEN 1 tablet daily for 3 days. , Disp: 18 tablet, Rfl: 0    LINZESS 290 MCG CAPS capsule, TAKE 1 CAPSULE BY MOUTH EVERY DAY, Disp: , Rfl:     VYVANSE 50 MG capsule, TAKE 1 CAPSULE BY MOUTH EVERY MORNING, Disp: , Rfl:     traZODone (DESYREL) 100 MG tablet, TAKE 1 TABLET BY MOUTH EVERY DAY AT BEDTIME AS NEEDED, Disp: , Rfl:     topiramate (TOPAMAX) 100 MG tablet, TAKE 1 TABLET BY MOUTH THREE TIMES A DAY, Disp: , Rfl:     cloNIDine (CATAPRES) 0.2 MG tablet, 0.2 mg 2 po @HS, Disp: , Rfl:     ARIPiprazole (ABILIFY) 15 MG tablet, Take 15 mg by mouth daily , Disp: , Rfl:     pregabalin (LYRICA) 50 MG capsule, Take 1 capsule by mouth 2 times daily for 180 days. , Disp: 180 capsule, Rfl: 1    tretinoin (RETIN-A) 0.025 % cream, TAKE 1 APPLICATION TOPICALLY ONCE PER DAY FOR 1 MONTH APPLY TO FACE EVERY NIGHT, Disp: , Rfl:     fluvoxaMINE (LUVOX) 100 MG tablet, Take 150 mg by mouth nightly 3 po qd at hs , Disp: , Rfl:     Potassium Citrate ER 15 MEQ (1620 MG) TBCR, Take 15 mEq by mouth 4 tabs po qd, Disp: , Rfl:   Allergies   Allergen Reactions    Latex Other (See Comments) and Rash     Other reaction(s): Makes skin bleed  On contact      Cefaclor  Penicillin G     Penicillins     Seasonal Other (See Comments)    Sulfa Antibiotics        Past Medical History:   Diagnosis Date    ADHD     Anxiety     Depression     GERD (gastroesophageal reflux disease)     Granuloma annulare     Kidney stone     Migraine      Past Surgical History:   Procedure Laterality Date    FINGER SURGERY Left 08/05/2015    Dr. Radha Valdes, ring finger; path report = granuloma annulare    TONSILLECTOMY       Family History   Problem Relation Age of Onset    Uterine Cancer Mother     Thyroid Disease Mother     Osteoporosis Mother     No Known Problems Father      Social History     Socioeconomic History    Marital status:      Spouse name: Not on file    Number of children: Not on file    Years of education: Not on file    Highest education level: Not on file   Occupational History    Occupation: previous phlebotomist, teacher   Tobacco Use    Smoking status: Never Smoker    Smokeless tobacco: Never Used   Vaping Use    Vaping Use: Never used   Substance and Sexual Activity    Alcohol use: Yes     Comment: rarely    Drug use: No     Comment: remote marijuana use    Sexual activity: Not on file   Other Topics Concern    Not on file   Social History Narrative    Not on file     Social Determinants of Health     Financial Resource Strain: Low Risk     Difficulty of Paying Living Expenses: Not hard at all   Food Insecurity: No Food Insecurity    Worried About Running Out of Food in the Last Year: Never true    [address] in the Last Year: Never true   Transportation Needs:     Lack of Transportation (Medical): Not on file    Lack of Transportation (Non-Medical):  Not on file   Physical Activity:     Days of Exercise per Week: Not on file    Minutes of Exercise per Session: Not on file   Stress:     Feeling of Stress : Not on file   Social Connections:     Frequency of Communication with Friends and Family: Not on file    Frequency of Social Gatherings with Friends and Family: Not on file    Attends [employer] Services: Not on file    Active Member of Clubs or Organizations: Not on file    Attends Club or Organization Meetings: Not on file    Marital Status: Not on file   Intimate Partner Violence:     Fear of Current or Ex-Partner: Not on file    Emotionally Abused: Not on file    Physically Abused: Not on file    Sexually Abused: Not on file   Housing Stability:     Unable to Pay for Housing in the Last Year: Not on file    Number of [place] in the Last Year: Not on file    Unstable Housing in the Last Year: Not on file       Vitals:    03/21/22 1550   BP: 110/82   Pulse: 86   Resp: 18   Temp: 97.1 °F (36.2 °C)   SpO2: 98%   Weight: 229 lb (103.9 kg)   Height: 5' 5\" (1.651 m)       Physical Exam  Vitals and nursing note reviewed. Constitutional:       Appearance: She is well-developed. HENT:      Head: Atraumatic. Right Ear: External ear normal.      Left Ear: External ear normal.      Nose: Nose normal.      Mouth/Throat:      Pharynx: No oropharyngeal exudate. Eyes:      Conjunctiva/sclera: Conjunctivae normal.      Pupils: Pupils are equal, round, and reactive to light. Neck:      Thyroid: No thyromegaly. Trachea: No tracheal deviation. Cardiovascular:      Rate and Rhythm: Normal rate and regular rhythm. Heart sounds: No murmur heard. No friction rub. No gallop. Pulmonary:      Effort: Pulmonary effort is normal. No respiratory distress. Breath sounds: Normal breath sounds. Comments: Point tenderness to palpation costochondral cartilages. Abdominal:      General: Bowel sounds are normal.      Palpations: Abdomen is soft. Musculoskeletal:         General: No tenderness or deformity. Normal range of motion. Cervical back: Normal range of motion and neck supple. Lymphadenopathy:      Cervical: No cervical adenopathy. Skin:     General: Skin is warm and dry.       Capillary Refill: Capillary refill takes less than 2 seconds. Findings: No rash. Neurological:      Mental Status: She is alert and oriented to person, place, and time. Sensory: No sensory deficit. Motor: No abnormal muscle tone.       Coordination: Coordination normal.      Deep Tendon Reflexes: Reflexes normal.             Seen By:  Radha Sabillon DO

## 2022-07-08 ENCOUNTER — OFFICE VISIT (OUTPATIENT)
Dept: FAMILY MEDICINE CLINIC | Age: 42
End: 2022-07-08
Payer: COMMERCIAL

## 2022-07-08 VITALS
TEMPERATURE: 97.3 F | RESPIRATION RATE: 18 BRPM | SYSTOLIC BLOOD PRESSURE: 118 MMHG | DIASTOLIC BLOOD PRESSURE: 74 MMHG | WEIGHT: 223 LBS | OXYGEN SATURATION: 98 % | HEART RATE: 85 BPM | HEIGHT: 64 IN | BODY MASS INDEX: 38.07 KG/M2

## 2022-07-08 DIAGNOSIS — K58.2 IRRITABLE BOWEL SYNDROME WITH BOTH CONSTIPATION AND DIARRHEA: Primary | ICD-10-CM

## 2022-07-08 DIAGNOSIS — F41.1 GENERALIZED ANXIETY DISORDER: ICD-10-CM

## 2022-07-08 PROCEDURE — G8427 DOCREV CUR MEDS BY ELIG CLIN: HCPCS | Performed by: FAMILY MEDICINE

## 2022-07-08 PROCEDURE — 1036F TOBACCO NON-USER: CPT | Performed by: FAMILY MEDICINE

## 2022-07-08 PROCEDURE — G8417 CALC BMI ABV UP PARAM F/U: HCPCS | Performed by: FAMILY MEDICINE

## 2022-07-08 PROCEDURE — 99213 OFFICE O/P EST LOW 20 MIN: CPT | Performed by: FAMILY MEDICINE

## 2022-07-08 ASSESSMENT — ENCOUNTER SYMPTOMS
TROUBLE SWALLOWING: 0
SINUS PAIN: 0
ALLERGIC/IMMUNOLOGIC NEGATIVE: 1
EYE PAIN: 0
BACK PAIN: 0
ABDOMINAL PAIN: 0
SHORTNESS OF BREATH: 0
PHOTOPHOBIA: 0
SORE THROAT: 0
VOMITING: 0
DIARRHEA: 0
CHEST TIGHTNESS: 0
EYE DISCHARGE: 0
EYE REDNESS: 0
COUGH: 0
BLOOD IN STOOL: 0
NAUSEA: 0

## 2022-07-08 NOTE — PROGRESS NOTES
22  Aguila Saha : 1980 Sex: female  Age: 39 y.o. Assessment and Plan:  Gigi Mckeon was seen today for discuss labs and 6 month follow-up. Diagnoses and all orders for this visit:    Irritable bowel syndrome with both constipation and diarrhea  This is stable, recent complaints. Generalized anxiety disorder  Health specialist managing this complaint. Return in about 6 months (around 2023). Chief Complaint   Patient presents with    Discuss Labs    6 Month Follow-Up     patient states that she has not needed to take lyrica. . has been good. Here for follow-up. Inflammatory symptoms under good control. She is not needing Lyrica anymore. Has been feeling good. Most recent lab work results were discussed and were stable. Review of Systems   Constitutional: Negative for appetite change, fatigue and unexpected weight change. HENT: Negative for congestion, ear pain, hearing loss, sinus pain, sore throat and trouble swallowing. Eyes: Negative for photophobia, pain, discharge and redness. Respiratory: Negative for cough, chest tightness and shortness of breath. Cardiovascular: Negative for chest pain, palpitations and leg swelling. Gastrointestinal: Negative for abdominal pain, blood in stool, diarrhea, nausea and vomiting. Endocrine: Negative. Genitourinary: Negative for dysuria, flank pain, frequency and hematuria. Musculoskeletal: Negative for arthralgias, back pain, joint swelling and myalgias. Skin: Negative. Allergic/Immunologic: Negative. Neurological: Negative for dizziness, seizures, syncope, weakness, light-headedness, numbness and headaches. Hematological: Negative for adenopathy. Does not bruise/bleed easily. Psychiatric/Behavioral: Negative.           Current Outpatient Medications:     topiramate (TOPAMAX) 200 MG tablet, TAKE 1 TABLET BY MOUTH EVERYDAY AT BEDTIME, Disp: , Rfl:     LINZESS 72 MCG CAPS capsule, , Disp: , Rfl:     VYVANSE 50 MG capsule, TAKE 1 CAPSULE BY MOUTH EVERY MORNING, Disp: , Rfl:     traZODone (DESYREL) 100 MG tablet, TAKE 1 TABLET BY MOUTH EVERY DAY AT BEDTIME AS NEEDED, Disp: , Rfl:     topiramate (TOPAMAX) 100 MG tablet, TAKE 1 TABLET BY MOUTH THREE TIMES A DAY, Disp: , Rfl:     cloNIDine (CATAPRES) 0.2 MG tablet, 0.2 mg 2 po @HS, Disp: , Rfl:     ARIPiprazole (ABILIFY) 15 MG tablet, Take 15 mg by mouth daily , Disp: , Rfl:     fluvoxaMINE (LUVOX) 100 MG tablet, Take 150 mg by mouth nightly 3 po qd at hs , Disp: , Rfl:     Potassium Citrate ER 15 MEQ (1620 MG) TBCR, Take 15 mEq by mouth 4 tabs po qd, Disp: , Rfl:     tretinoin (RETIN-A) 0.025 % cream, TAKE 1 APPLICATION TOPICALLY ONCE PER DAY FOR 1 MONTH APPLY TO FACE EVERY NIGHT (Patient not taking: Reported on 7/8/2022), Disp: , Rfl:   Allergies   Allergen Reactions    Latex Other (See Comments) and Rash     Other reaction(s): Makes skin bleed  On contact      Cefaclor     Penicillin G     Penicillins     Seasonal Other (See Comments)    Sulfa Antibiotics        Past Medical History:   Diagnosis Date    ADHD     Anxiety     Depression     GERD (gastroesophageal reflux disease)     Granuloma annulare     Kidney stone     Migraine      Past Surgical History:   Procedure Laterality Date    FINGER SURGERY Left 08/05/2015    Dr. Lizeth Beltran, ring finger; path report = granuloma annulare    TONSILLECTOMY       Family History   Problem Relation Age of Onset    Uterine Cancer Mother     Thyroid Disease Mother     Osteoporosis Mother     No Known Problems Father      Social History     Socioeconomic History    Marital status:      Spouse name: Not on file    Number of children: Not on file    Years of education: Not on file    Highest education level: Not on file   Occupational History    Occupation: previous phlebotomist, teacher   Tobacco Use    Smoking status: Never Smoker    Smokeless tobacco: Never Used   Vaping Use  Vaping Use: Never used   Substance and Sexual Activity    Alcohol use: Yes     Comment: rarely    Drug use: No     Comment: remote marijuana use    Sexual activity: Not on file   Other Topics Concern    Not on file   Social History Narrative    Not on file     Social Determinants of Health     Financial Resource Strain: Low Risk     Difficulty of Paying Living Expenses: Not hard at all   Food Insecurity: No Food Insecurity    Worried About [address] in the Last Year: Never true    [address] [facility] in the Last Year: Never true   Transportation Needs:     Lack of Transportation (Medical): Not on file    Lack of Transportation (Non-Medical): Not on file   Physical Activity:     Days of Exercise per Week: Not on file    Minutes of Exercise per Session: Not on file   Stress:     Feeling of Stress : Not on file   Social Connections:     Frequency of Communication with Friends and Family: Not on file    Frequency of Social Gatherings with Friends and Family: Not on file    Attends [employer] Services: Not on file    Active Member of [address] [place] [address] or Organizations: Not on file    Attends Club or Organization Meetings: Not on file    Marital Status: Not on file   Intimate Partner Violence:     Fear of Current or Ex-Partner: Not on file    Emotionally Abused: Not on file    Physically Abused: Not on file    Sexually Abused: Not on file   Housing Stability:     Unable to Pay for Housing in the Last Year: Not on file    Number of [place] in the Last Year: Not on file    Unstable Housing in the Last Year: Not on file       Vitals:    07/08/22 1056   BP: 118/74   Pulse: 85   Resp: 18   Temp: 97.3 °F (36.3 °C)   SpO2: 98%   Weight: 223 lb (101.2 kg)   Height: 5' 4\" (1.626 m)       Physical Exam  Vitals and nursing note reviewed. Constitutional:       Appearance: She is well-developed. HENT:      Head: Atraumatic.       Right Ear: External ear normal.      Left Ear: External ear normal. Nose: Nose normal.      Mouth/Throat:      Pharynx: No oropharyngeal exudate. Eyes:      Conjunctiva/sclera: Conjunctivae normal.      Pupils: Pupils are equal, round, and reactive to light. Neck:      Thyroid: No thyromegaly. Trachea: No tracheal deviation. Cardiovascular:      Rate and Rhythm: Normal rate and regular rhythm. Heart sounds: No murmur heard. No friction rub. No gallop. Pulmonary:      Effort: Pulmonary effort is normal. No respiratory distress. Breath sounds: Normal breath sounds. Abdominal:      General: Bowel sounds are normal.      Palpations: Abdomen is soft. Musculoskeletal:         General: No tenderness or deformity. Normal range of motion. Cervical back: Normal range of motion and neck supple. Lymphadenopathy:      Cervical: No cervical adenopathy. Skin:     General: Skin is warm and dry. Capillary Refill: Capillary refill takes less than 2 seconds. Findings: No rash. Neurological:      Mental Status: She is alert and oriented to person, place, and time. Sensory: No sensory deficit. Motor: No abnormal muscle tone.       Coordination: Coordination normal.      Deep Tendon Reflexes: Reflexes normal.             Seen By:  Gregg Wilkinson DO

## 2022-07-28 ENCOUNTER — OFFICE VISIT (OUTPATIENT)
Dept: FAMILY MEDICINE CLINIC | Age: 42
End: 2022-07-28
Payer: COMMERCIAL

## 2022-07-28 VITALS
BODY MASS INDEX: 39.09 KG/M2 | OXYGEN SATURATION: 98 % | WEIGHT: 229 LBS | RESPIRATION RATE: 16 BRPM | HEIGHT: 64 IN | DIASTOLIC BLOOD PRESSURE: 84 MMHG | TEMPERATURE: 98.1 F | SYSTOLIC BLOOD PRESSURE: 130 MMHG | HEART RATE: 86 BPM

## 2022-07-28 DIAGNOSIS — E66.01 CLASS 2 SEVERE OBESITY DUE TO EXCESS CALORIES WITH SERIOUS COMORBIDITY AND BODY MASS INDEX (BMI) OF 39.0 TO 39.9 IN ADULT (HCC): Primary | ICD-10-CM

## 2022-07-28 PROCEDURE — 99213 OFFICE O/P EST LOW 20 MIN: CPT | Performed by: FAMILY MEDICINE

## 2022-07-28 PROCEDURE — G8427 DOCREV CUR MEDS BY ELIG CLIN: HCPCS | Performed by: FAMILY MEDICINE

## 2022-07-28 PROCEDURE — G8417 CALC BMI ABV UP PARAM F/U: HCPCS | Performed by: FAMILY MEDICINE

## 2022-07-28 PROCEDURE — 1036F TOBACCO NON-USER: CPT | Performed by: FAMILY MEDICINE

## 2022-07-28 NOTE — PROGRESS NOTES
22  Pelon Hahn : 1980 Sex: female  Age: 39 y.o. Assessment and Plan:  Bard Seals was seen today for other. Diagnoses and all orders for this visit:    Class 2 severe obesity due to excess calories with serious comorbidity and body mass index (BMI) of 39.0 to 39.9 in adult [facility])  -     [id number] [facility]  Bariatrics will prescribe medication and as a part of their comprehensive work-up. Referral was placed. Return in about 3 months (around 10/28/2022). Chief Complaint   Patient presents with    Other     weight       Patient is concerned over her weight gain, would like something for weight loss. Been researching medications and would like one of the GLP-1 inhibitors that is injectable. I recommended bariatric referral and hence a program for weight loss. I will check with [facility] to see if they use these medications. Review of Systems   Constitutional:  Negative for appetite change, fatigue and unexpected weight change. HENT:  Negative for congestion, ear pain, hearing loss, sinus pain, sore throat and trouble swallowing. Eyes:  Negative for photophobia, pain, discharge and redness. Respiratory:  Negative for cough, chest tightness and shortness of breath. Cardiovascular:  Negative for chest pain, palpitations and leg swelling. Gastrointestinal:  Negative for abdominal pain, blood in stool, diarrhea, nausea and vomiting. Endocrine: Negative. Genitourinary:  Negative for dysuria, flank pain, frequency and hematuria. Musculoskeletal:  Negative for arthralgias, back pain, joint swelling and myalgias. Skin: Negative. Allergic/Immunologic: Negative. Neurological:  Negative for dizziness, seizures, syncope, weakness, light-headedness, numbness and headaches. Hematological:  Negative for adenopathy. Does not bruise/bleed easily. Psychiatric/Behavioral: Negative.          Current Outpatient Medications:     topiramate (TOPAMAX) 200 MG tablet, TAKE 1 TABLET BY MOUTH EVERYDAY AT BEDTIME, Disp: , Rfl:     LINZESS 72 MCG CAPS capsule, , Disp: , Rfl:     VYVANSE 50 MG capsule, TAKE 1 CAPSULE BY MOUTH EVERY MORNING, Disp: , Rfl:     traZODone (DESYREL) 100 MG tablet, TAKE 1 TABLET BY MOUTH EVERY DAY AT BEDTIME AS NEEDED, Disp: , Rfl:     topiramate (TOPAMAX) 100 MG tablet, TAKE 1 TABLET BY MOUTH THREE TIMES A DAY, Disp: , Rfl:     cloNIDine (CATAPRES) 0.2 MG tablet, 0.2 mg 2 po @HS, Disp: , Rfl:     ARIPiprazole (ABILIFY) 15 MG tablet, Take 15 mg by mouth daily , Disp: , Rfl:     tretinoin (RETIN-A) 0.025 % cream, TAKE 1 APPLICATION TOPICALLY ONCE PER DAY FOR 1 MONTH APPLY TO FACE EVERY NIGHT, Disp: , Rfl:     fluvoxaMINE (LUVOX) 100 MG tablet, Take 150 mg by mouth nightly 3 po qd at hs , Disp: , Rfl:     Potassium Citrate ER 15 MEQ (1620 MG) TBCR, Take 15 mEq by mouth 4 tabs po qd, Disp: , Rfl:   Allergies   Allergen Reactions    Latex Other (See Comments) and Rash     Other reaction(s): Makes skin bleed  On contact      Cefaclor     Penicillin G     Penicillins     Seasonal Other (See Comments)    Sulfa Antibiotics        Past Medical History:   Diagnosis Date    ADHD     Anxiety     Depression     GERD (gastroesophageal reflux disease)     Granuloma annulare     Kidney stone     Migraine      Past Surgical History:   Procedure Laterality Date    FINGER SURGERY Left 08/05/2015    Dr. Jenae Boyd, ring finger; path report = granuloma annulare    TONSILLECTOMY       Family History   Problem Relation Age of Onset    Uterine Cancer Mother     Thyroid Disease Mother     Osteoporosis Mother     No Known Problems Father      Social History     Socioeconomic History    Marital status:      Spouse name: Not on file    Number of children: Not on file    Years of education: Not on file    Highest education level: Not on file   Occupational History    Occupation: previous phlebotomist, teacher   Tobacco Use    Smoking status: Never    Smokeless tobacco: Never   Vaping Use    Vaping Use: Never used   Substance and Sexual Activity    Alcohol use: Yes     Comment: rarely    Drug use: No     Comment: remote marijuana use    Sexual activity: Not on file   Other Topics Concern    Not on file   Social History Narrative    Not on file     Social Determinants of Health     Financial Resource Strain: Low Risk     Difficulty of Paying Living Expenses: Not hard at all   Food Insecurity: No Food Insecurity    Worried About Running Out of Food in the Last Year: Never true    Ran Out of Food in the Last Year: Never true   Transportation Needs: Not on file   Physical Activity: Not on file   Stress: Not on file   Social Connections: Not on file   Intimate Partner Violence: Not on file   Housing Stability: Not on file       Vitals:    07/28/22 1155   BP: 130/84   Pulse: 86   Resp: 16   Temp: 98.1 °F (36.7 °C)   TempSrc: Temporal   SpO2: 98%   Weight: 229 lb (103.9 kg)   Height: 5' 4\" (1.626 m)       Physical Exam  Vitals and nursing note reviewed. Constitutional:       Appearance: She is well-developed. She is obese. HENT:      Head: Atraumatic. Right Ear: External ear normal.      Left Ear: External ear normal.      Nose: Nose normal.      Mouth/Throat:      Pharynx: No oropharyngeal exudate. Eyes:      Conjunctiva/sclera: Conjunctivae normal.      Pupils: Pupils are equal, round, and reactive to light. Neck:      Thyroid: No thyromegaly. Trachea: No tracheal deviation. Cardiovascular:      Rate and Rhythm: Normal rate and regular rhythm. Heart sounds: No murmur heard. No friction rub. No gallop. Pulmonary:      Effort: Pulmonary effort is normal. No respiratory distress. Breath sounds: Normal breath sounds. Abdominal:      General: Bowel sounds are normal.      Palpations: Abdomen is soft. Musculoskeletal:         General: No tenderness or deformity. Normal range of motion. Cervical back: Normal range of motion and neck supple. Lymphadenopathy:      Cervical: No cervical adenopathy. Skin:     General: Skin is warm and dry. Capillary Refill: Capillary refill takes less than 2 seconds. Findings: No rash. Neurological:      Mental Status: She is alert and oriented to person, place, and time. Sensory: No sensory deficit. Motor: No abnormal muscle tone.       Coordination: Coordination normal.      Deep Tendon Reflexes: Reflexes normal.           Seen By:  Shin Shepherd DO

## 2022-07-29 ASSESSMENT — ENCOUNTER SYMPTOMS
COUGH: 0
SHORTNESS OF BREATH: 0
ABDOMINAL PAIN: 0
NAUSEA: 0
ALLERGIC/IMMUNOLOGIC NEGATIVE: 1
PHOTOPHOBIA: 0
SINUS PAIN: 0
VOMITING: 0
EYE REDNESS: 0
SORE THROAT: 0
TROUBLE SWALLOWING: 0
EYE DISCHARGE: 0
CHEST TIGHTNESS: 0
BLOOD IN STOOL: 0
BACK PAIN: 0
DIARRHEA: 0
EYE PAIN: 0

## 2022-09-22 ENCOUNTER — OFFICE VISIT (OUTPATIENT)
Dept: BARIATRICS/WEIGHT MGMT | Age: 42
End: 2022-09-22
Payer: COMMERCIAL

## 2022-09-22 VITALS
WEIGHT: 231.4 LBS | HEART RATE: 93 BPM | TEMPERATURE: 97.3 F | BODY MASS INDEX: 38.55 KG/M2 | HEIGHT: 65 IN | SYSTOLIC BLOOD PRESSURE: 120 MMHG | DIASTOLIC BLOOD PRESSURE: 75 MMHG

## 2022-09-22 DIAGNOSIS — R53.82 CHRONIC FATIGUE: Primary | ICD-10-CM

## 2022-09-22 DIAGNOSIS — E66.09 CLASS 2 OBESITY DUE TO EXCESS CALORIES WITHOUT SERIOUS COMORBIDITY WITH BODY MASS INDEX (BMI) OF 38.0 TO 38.9 IN ADULT: ICD-10-CM

## 2022-09-22 PROCEDURE — 99205 OFFICE O/P NEW HI 60 MIN: CPT | Performed by: INTERNAL MEDICINE

## 2022-09-22 PROCEDURE — 99202 OFFICE O/P NEW SF 15 MIN: CPT

## 2022-09-22 NOTE — PATIENT INSTRUCTIONS
Rules:  Count every calorie every day  Limit sweets to one day per month  Limit chips/crackers/pretzels/nuts/popcorn to 150 bk/day  Eliminate all sugar sweetened beverages (including fruit juice)  Limit restaurants (including fast food and food from a convenience store) to one time every two weeks while in town    Requirements:  Make sure protein intake is at least 70 grams per day (do not count protein every day; instead spot check your intake every 2-3 weeks and make sure what you think you are getting is close to accurate; consider using a protein shake if needed; these are in the pharmacy section of the stores, not the grocery section; Premier, Pure Protein and Fairlife are relatively inexpensive and taste good to most patients; other options are Nectar, Boost Max, Ensure Max, BeneProtein and [employer]NC lean (which is lactose-free); Nectar fruit, Premier Protein Clear, IsoPure Protein Drink, and Protein 2 O are water-based options; Quest (or Cosco, which is cheaper and is ordered on [employer]) and the [employer] protein bars can also be used, but have less protein in them ) (<200 Bk, >25 g protein) - (chicken, fish, turkey, egg white)  (Disclaimer: Dietary supplements rarely have their listed ingredients and the amount of each verified by a third party other. Sometimes they give verification for their claims to be GMO and gluten free and to be organic. However, even such verifications as these may still be untrustworthy.)  Make sure that fiber intake is at least 25 grams per day. Do this by either eating 12 tablespoons of the original, plain Fiber One cereal every day or 4 tablespoons of wheat dextrin powder (Benefiber or a generic brand) every day. Work up to this amount slowly by starting with only one-eighth to one-fourth of the target amount and then adding another one-eighth to one-fourth every one or two weeks until reaching the target.   Take one multivitamin every day    Targets:  Limit calorie intake to 1400 calories/day  Walk 30 minutes daily   Avoid eating 2 hours within bedtime. Tips:  Do not eat outside of the dining room or the kitchen  Do not eat while watching TV, videos, working on the computer or using a smart phone  Do not eat food out of a multi-serving bag or container. Follow up in 4 weeks.

## 2022-09-22 NOTE — PROGRESS NOTES
CC -   Wants to lose weight for health concerns. Would like to be <140 lbs    BACKGROUND -   First visit: 9/22/22    Obesity (all weight in lbs)  Began in 2018  Initial BMI 38.51, Wt 231, Ht 65\"  HS Grad wt 145   Lowest   wt 140   Highest  wt 231.4  Pattern of wt gain: gradual  Wt change past yr: +24 lbs  Most wt lost: ~20 lbs (last summer)  Other diets attempted: changed diet - more salads/fruit, walking    Desire to lose weight: 10/10    Initial Diet:    Number of meals per day - 3 (B-L-D)    Number of snacks per day - 1 (night snack)    Meal volume - 12\" plate,  never seconds    Fast food/convenience store - 0-1x/week    Restaurants (not fast food) - 0-1x/week   Sweets - 7d/week (after dinner)   Chips - 7d/week   Crackers/pretzels - 7d/week (triscuits)   Nuts - 0d/week   Peanut Butter - 0d/week   Popcorn - 0d/week   Dried fruit - 0d/week   Whole fruit - 2d/week   Breakfast cereal - 0d/week   Granola/Protein/Energy bar - 0d/week   Sugar sweetened beverages - gingerale ~1x/wk. Orange juice about 1xwk, no coffee lately, tea (calorie free), no energy drink   Protein - No supplements currently   Fiber - No supplements    Wt effect of HR foods = 2100 Bk/wk = 300 Bk/d= 14% DEN = 31 lb/year. Initial Exercise:    Gym membership - no (has treadmill)    Walking - no    Running - no    Resistance - no    Aerobic class - no     Bedtime 10-10:30->~4:50->7/8am. Daytime tiredness+. No daytime naps.  ______________________    [facility] -  Past Medical History:   Diagnosis Date    ADHD     Anxiety     Depression     GERD (gastroesophageal reflux disease)     Granuloma annulare     Kidney stone     Migraine    Chronic Inflammatory Response Syndrome (??)  Past Surgical History:   Procedure Laterality Date    FINGER SURGERY Left 08/05/2015    Dr. Mary Valencia, ring finger; path report = granuloma annulare    TONSILLECTOMY       Prior to Admission medications    Medication Sig Start Date End Date Taking?  Authorizing Provider topiramate (TOPAMAX) 200 MG tablet TAKE 1 TABLET BY MOUTH EVERYDAY AT BEDTIME 3/14/22   Historical Provider, MD   Villareal Esters 72 MCG CAPS capsule  8/25/21   Historical Provider, MD   VYVANSE 50 MG capsule TAKE 1 CAPSULE BY MOUTH EVERY MORNING 9/10/21   Historical Provider, MD   traZODone (DESYREL) 100 MG tablet TAKE 1 TABLET BY MOUTH EVERY DAY AT BEDTIME AS NEEDED 9/27/21   Historical Provider, MD   topiramate (TOPAMAX) 100 MG tablet TAKE 1 TABLET BY MOUTH THREE TIMES A DAY 8/26/20   Historical Provider, MD   cloNIDine (CATAPRES) 0.2 MG tablet 0.2 mg 2 po @HS 8/28/20   Historical Provider, MD   ARIPiprazole (ABILIFY) 15 MG tablet Take 15 mg by mouth daily     Historical Provider, MD   tretinoin (RETIN-A) 0.025 % cream TAKE 1 APPLICATION TOPICALLY ONCE PER DAY FOR 1 MONTH APPLY TO FACE EVERY NIGHT 12/14/19   Historical Provider, MD   fluvoxaMINE (LUVOX) 100 MG tablet Take 150 mg by mouth nightly 3 po qd at hs     Historical Provider, MD   Potassium Citrate ER 15 MEQ (1620 MG) TBCR Take 15 mEq by mouth 4 tabs po qd    Historical Provider, MD     Allergies: Allergies   Allergen Reactions    Latex Other (See Comments) and Rash     Other reaction(s): Makes skin bleed  On contact      Cefaclor     Penicillin G     Penicillins     Seasonal Other (See Comments)    Sulfa Antibiotics      Social history: smoking: in college (not anymore) ; Alcohol: occasionally , work: not currently (d/t CIRS). ROS -  Card - no CP  GI - no N/V/D/C    PE -  Gen : /75 (Site: Left Upper Arm, Position: Sitting, Cuff Size: Large Adult)   Pulse 93   Temp 97.3 °F (36.3 °C) (Temporal)   Ht 5' 5\" (1.651 m)   Wt 231 lb 6.4 oz (105 kg)   BMI 38.51 kg/m²    WN, WD, NAD  Lung: Nml resp effort, CTA B/L  Heart:  RRR w/o MGR, no LE pitting edema b/l  Psych: Normal mood   Full affect  Neuro:  Moves all ext well  ______________________    HISTORY & ASSESSMENT/PLAN -     Problem 1  - Fatigue/tiredness   HPI   - Ongoing, no daytime naps even though she feels tired during daytime, weight gain can contribute  Assessment  - Uncontrolled   Plan   - Lab results from Feb 2022 reviewed (GFR 58, TSH/lipid panel/LFT WNL). Weight reduction can help if weight is contributing to fatigue. Weight reduction per plan below    Problem 2  - Obesity   HPI   - See above Background for description    Weight  Date    231.4  9/22/22    Patient's estimated daily energy need (DEN) = 2101 Bk/d = 07989 Bk/wk    Assessment  - Uncontrolled    Plan   - Talked about various options. Recommended calorie counting with low calorie diet as detailed below. Recommended starting without an appetite suppressant (Already on Vyvanse for ADHD and Topiramate for appetite suppression and migraine). Planned as follows:  Patient Instructions   Rules:  Count every calorie every day  Limit sweets to one day per month  Limit chips/crackers/pretzels/nuts/popcorn to 150 bk/day  Eliminate all sugar sweetened beverages (including fruit juice)  Limit restaurants (including fast food and food from a convenience store) to one time every two weeks while in town    Requirements:  Make sure protein intake is at least 70 grams per day (do not count protein every day; instead spot check your intake every 2-3 weeks and make sure what you think you are getting is close to accurate; consider using a protein shake if needed; these are in the pharmacy section of the stores, not the grocery section; Premier, Pure Protein and Fairlife are relatively inexpensive and taste good to most patients; other options are Nectar, Boost Max, Ensure Max, BeneProtein and [employer]NC lean (which is lactose-free);    Nectar fruit, Premier Protein Clear, IsoPure Protein Drink, and Protein 2 O are water-based options; Quest (or Cosco, which is cheaper and is ordered on [employer]) and the [employer] 1 protein bars can also be used, but have less protein in them ) (<200 Bk, >25 g protein) - (chicken, fish, turkey, egg white)  (Disclaimer: Dietary supplements rarely have their listed ingredients and the amount of each verified by a third party other. Sometimes they give verification for their claims to be GMO and gluten free and to be organic. However, even such verifications as these may still be untrustworthy.)  Make sure that fiber intake is at least 25 grams per day. Do this by either eating 12 tablespoons of the original, plain Fiber One cereal every day or 4 tablespoons of wheat dextrin powder (Benefiber or a generic brand) every day. Work up to this amount slowly by starting with only one-eighth to one-fourth of the target amount and then adding another one-eighth to one-fourth every one or two weeks until reaching the target. Take one multivitamin every day    Targets:  Limit calorie intake to 1400 calories/day  Walk 30 minutes daily   Avoid eating 2 hours within bedtime. Tips:  Do not eat outside of the dining room or the kitchen  Do not eat while watching TV, videos, working on the computer or using a smart phone  Do not eat food out of a multi-serving bag or container. Follow up in 4 weeks. Total time spent on encounter: 62 min. Leonarda Pickett MD  Internal Medicine/Obesity Medicine  9/22/2022.

## 2022-10-25 ENCOUNTER — OFFICE VISIT (OUTPATIENT)
Dept: BARIATRICS/WEIGHT MGMT | Age: 42
End: 2022-10-25
Payer: COMMERCIAL

## 2022-10-25 VITALS
TEMPERATURE: 97.3 F | SYSTOLIC BLOOD PRESSURE: 95 MMHG | BODY MASS INDEX: 36.65 KG/M2 | WEIGHT: 220 LBS | DIASTOLIC BLOOD PRESSURE: 56 MMHG | HEIGHT: 65 IN | HEART RATE: 77 BPM

## 2022-10-25 DIAGNOSIS — R53.82 CHRONIC FATIGUE: Primary | ICD-10-CM

## 2022-10-25 DIAGNOSIS — E66.09 CLASS 2 OBESITY DUE TO EXCESS CALORIES WITHOUT SERIOUS COMORBIDITY WITH BODY MASS INDEX (BMI) OF 36.0 TO 36.9 IN ADULT: ICD-10-CM

## 2022-10-25 PROBLEM — E66.812 CLASS 2 OBESITY DUE TO EXCESS CALORIES WITHOUT SERIOUS COMORBIDITY WITH BODY MASS INDEX (BMI) OF 36.0 TO 36.9 IN ADULT: Status: ACTIVE | Noted: 2022-10-25

## 2022-10-25 PROCEDURE — G8417 CALC BMI ABV UP PARAM F/U: HCPCS | Performed by: INTERNAL MEDICINE

## 2022-10-25 PROCEDURE — 1036F TOBACCO NON-USER: CPT | Performed by: INTERNAL MEDICINE

## 2022-10-25 PROCEDURE — 99211 OFF/OP EST MAY X REQ PHY/QHP: CPT

## 2022-10-25 PROCEDURE — G8484 FLU IMMUNIZE NO ADMIN: HCPCS | Performed by: INTERNAL MEDICINE

## 2022-10-25 PROCEDURE — 99213 OFFICE O/P EST LOW 20 MIN: CPT | Performed by: INTERNAL MEDICINE

## 2022-10-25 PROCEDURE — G8428 CUR MEDS NOT DOCUMENT: HCPCS | Performed by: INTERNAL MEDICINE

## 2022-10-25 NOTE — PATIENT INSTRUCTIONS
Low calorie non-starchy vegetables:  Food (per 100 g) Calories Fibers T. Carbohydrates Protein Fat Sodium (mg) Potassium (mg)   Green Bell Peppers 10 1.7 4.6 0.9 0.2 3 175   Cucumbers 15 0.5 3.6 0.7 0.1 2 147   Celery 16 1.6 3 0.7 0.2 80 260   Tomatoes 18 1.2 3.9 0.9 0.2 5 237   Carrots 41 2.8 10 0.9 0.2 69 320   Cabbage 25 2.5 6 1.3 0.1 18 170   Broccoli 35 3.3 7.2 2.4 0.4 41 293   Cauliflower 23 2.3 4.1 1.8 0.5 15 142   Iceberg Lettuce 14 1.2 3 0.9 0.1 10 141   Kale 28 2 5.6 1.9 0.4 23 228   Brussel Sprouts 43 3.8 9 3.4 0.3 25 389   Spinach 23 2.4 3.8 3 0.3 70 466   Zucchini 15 1 2.7 1.1 0.4 3 264   Mushroom 28 2.2 5.3 2.2 0.5 2 356   Asparagus 22 2 4.1 2.4 0.2 14 224   Green Beans 35 3.2 7.9 1.9 0.3 1 146   Eggplant 35 2.5 8.7 0.8 0.2 1 123     Rules:  Count every calorie every day  Limit sweets to one day per month  Limit chips/crackers/pretzels/nuts/popcorn to 150 jose francisco/day  Eliminate all sugar sweetened beverages (including fruit juice)  Limit restaurants (including fast food and food from a convenience store) to one time every two weeks while in town    Requirements:  Make sure protein intake is at least 70 grams per day   Make sure that fiber intake is at least 25 grams per day  Take one multivitamin every day    Targets:  Limit calorie intake to 1400 calories/day  Walk 30 minutes daily   Avoid eating 2 hours within bedtime. Tips:  Do not eat outside of the dining room or the kitchen  Do not eat while watching TV, videos, working on the computer or using a smart phone  Do not eat food out of a multi-serving bag or container. Follow up in about 6 weeks.

## 2022-10-25 NOTE — PROGRESS NOTES
CC -   Follow up of: Wants to lose weight for health concerns. Would like to be <140 lbs    BACKGROUND -   Last visit: 9/22/22  First visit: 9/22/22    Obesity (all weight in lbs)  Began in 2018  Initial BMI 38.51, Wt 231, Ht 65\"  HS Grad wt 145   Lowest   wt 140   Highest  wt 231.4  Pattern of wt gain: gradual  Wt change past yr: +24 lbs  Most wt lost: ~20 lbs (last summer)  Other diets attempted: changed diet - more salads/fruit, walking    Desire to lose weight: 10/10    Initial Diet:    Number of meals per day - 3 (B-L-D)    Number of snacks per day - 1 (night snack)    Meal volume - 12\" plate,  never seconds    Fast food/convenience store - 0-1x/week    Restaurants (not fast food) - 0-1x/week   Sweets - 7d/week (after dinner)   Chips - 7d/week   Crackers/pretzels - 7d/week (triscuits)   Nuts - 0d/week   Peanut Butter - 0d/week   Popcorn - 0d/week   Dried fruit - 0d/week   Whole fruit - 2d/week   Breakfast cereal - 0d/week   Granola/Protein/Energy bar - 0d/week   Sugar sweetened beverages - gingerale ~1x/wk. Orange juice about 1xwk, no coffee lately, tea (calorie free), no energy drink   Protein - No supplements currently   Fiber - No supplements    Wt effect of HR foods = 2100 Bk/wk = 300 Bk/d= 14% DEN = 31 lb/year. Initial Exercise:    Gym membership - no (has treadmill)    Walking - no    Running - no    Resistance - no    Aerobic class - no     Bedtime 10-10:30->~4:50->7/8am. Daytime tiredness+.  No daytime naps.  ______________________    [facility] -  Past Medical History:   Diagnosis Date    ADHD     Anxiety     Depression     GERD (gastroesophageal reflux disease)     Granuloma annulare     Kidney stone     Migraine    Chronic Inflammatory Response Syndrome (??)  Past Surgical History:   Procedure Laterality Date    FINGER SURGERY Left 08/05/2015    Dr. Lord Jones, ring finger; path report = granuloma annulare    TONSILLECTOMY       Prior to Admission medications    Medication Sig Start Date End Date Taking? Authorizing Provider   topiramate (TOPAMAX) 200 MG tablet TAKE 1 TABLET BY MOUTH EVERYDAY AT BEDTIME 3/14/22   Historical Provider, MD Verdugo Failing 72 MCG CAPS capsule  8/25/21   Historical Provider, MD   VYVANSE 50 MG capsule TAKE 1 CAPSULE BY MOUTH EVERY MORNING 9/10/21   Historical Provider, MD   traZODone (DESYREL) 100 MG tablet TAKE 1 TABLET BY MOUTH EVERY DAY AT BEDTIME AS NEEDED 9/27/21   Historical Provider, MD   topiramate (TOPAMAX) 100 MG tablet TAKE 1 TABLET BY MOUTH THREE TIMES A DAY 8/26/20   Historical Provider, MD   cloNIDine (CATAPRES) 0.2 MG tablet 0.2 mg 2 po @HS 8/28/20   Historical Provider, MD   ARIPiprazole (ABILIFY) 15 MG tablet Take 15 mg by mouth daily     Historical Provider, MD   tretinoin (RETIN-A) 0.025 % cream TAKE 1 APPLICATION TOPICALLY ONCE PER DAY FOR 1 MONTH APPLY TO FACE EVERY NIGHT 12/14/19   Historical Provider, MD   fluvoxaMINE (LUVOX) 100 MG tablet Take 150 mg by mouth nightly 3 po qd at hs     Historical Provider, MD   Potassium Citrate ER 15 MEQ (1620 MG) TBCR Take 15 mEq by mouth 4 tabs po qd    Historical Provider, MD     10/25/22: Taking MVI daily    Allergies: Allergies   Allergen Reactions    Latex Other (See Comments) and Rash     Other reaction(s): Makes skin bleed  On contact      Cefaclor     Penicillin G     Penicillins     Seasonal Other (See Comments)    Sulfa Antibiotics      Social history: smoking: in college (not anymore) ; Alcohol: occasionally , work: not currently (d/t CIRS). ROS -  Card - no CP  GI - no N/V/D/C    PE -  Gen : BP (!) 95/56 (Site: Left Upper Arm, Position: Sitting, Cuff Size: Large Adult)   Pulse 77   Temp 97.3 °F (36.3 °C) (Temporal)   Ht 5' 5\" (1.651 m)   Wt 220 lb (99.8 kg)   BMI 36.61 kg/m²    WN, WD, NAD  Lung: Nml resp effort, CTA B/L  Heart:  RRR w/o MGR, no LE pitting edema b/l  Psych: Normal mood   Full affect  Neuro:  Moves all ext well  ______________________    HISTORY & ASSESSMENT/PLAN -     Problem 1  - Fatigue/tiredness   HPI   - does feel tired lizett after workout but not much as prior, sleep is good  Assessment  - improving  Plan   - Doing little better. Weight reduction can help if weight is contributing to fatigue. Weight reduction per plan below    Problem 2  - Obesity   HPI   - See above Background for description    Weight  Date    231.4  22    220.0  10/25/22    Total weight change to date: -11.4 lbs. Average daily energy variance:  2022 - 10/25/2022: -9.8 lbs (51054 Bk)/32 d = -1072 Bk/d deficit. Patient's estimated daily energy need (DEN) = 2101 Bk/d = 53752 Bk/wk    Update:  Calorie monitorind/wk, usual intake <1300 Bk/d  Sweets: 0d/month  SSB: none (sugar free lemonade  Restaurant food: 3x/month  Appetite suppressant: None (on Vyvanse and Topiramate)  Home BP monitoring: NA  Protein: ~50-80g/d  Fiber: no supplement currently, too much fiber upsets her stomach  Water: increasing - 64 oz/d current goal  Activities: walking daily (smt 10k steps      Assessment  - Uncontrolled    Plan   - She is doing very well with her plan and can continue. Does have some difficulty lizett in the evening, but does not feel the need for an appetite suppressant (Already on Vyvanse for ADHD and Topiramate for appetite suppression and migraine). BP little low currently but patient deniees any dizziness or lightheadedness (on clonidine 0.2 qhs). Planned as follows:  Patient Instructions   List of low calorie non-starchy vegetables was provided.      Rules:  Count every calorie every day  Limit sweets to one day per month  Limit chips/crackers/pretzels/nuts/popcorn to 150 bk/day  Eliminate all sugar sweetened beverages (including fruit juice)  Limit restaurants (including fast food and food from a convenience store) to one time every two weeks while in town    Requirements:  Make sure protein intake is at least 70 grams per day   Make sure that fiber intake is at least 25 grams per day  Take one multivitamin every day    Targets:  Limit calorie intake to 1400 calories/day  Walk 30 minutes daily   Avoid eating 2 hours within bedtime. Tips:  Do not eat outside of the dining room or the kitchen  Do not eat while watching TV, videos, working on the computer or using a smart phone  Do not eat food out of a multi-serving bag or container. Follow up in about 6 weeks. Total time spent on encounter: 20 min. Mounika Espinosa MD  Internal Medicine/Obesity Medicine  10/25/2022.

## 2022-12-06 ENCOUNTER — OFFICE VISIT (OUTPATIENT)
Dept: BARIATRICS/WEIGHT MGMT | Age: 42
End: 2022-12-06
Payer: COMMERCIAL

## 2022-12-06 VITALS
TEMPERATURE: 97.5 F | BODY MASS INDEX: 35.49 KG/M2 | HEIGHT: 65 IN | SYSTOLIC BLOOD PRESSURE: 102 MMHG | DIASTOLIC BLOOD PRESSURE: 59 MMHG | HEART RATE: 76 BPM | WEIGHT: 213 LBS

## 2022-12-06 DIAGNOSIS — E66.09 CLASS 2 OBESITY DUE TO EXCESS CALORIES WITHOUT SERIOUS COMORBIDITY WITH BODY MASS INDEX (BMI) OF 35.0 TO 35.9 IN ADULT: ICD-10-CM

## 2022-12-06 DIAGNOSIS — R53.82 CHRONIC FATIGUE: Primary | ICD-10-CM

## 2022-12-06 PROCEDURE — G8428 CUR MEDS NOT DOCUMENT: HCPCS | Performed by: INTERNAL MEDICINE

## 2022-12-06 PROCEDURE — G8484 FLU IMMUNIZE NO ADMIN: HCPCS | Performed by: INTERNAL MEDICINE

## 2022-12-06 PROCEDURE — 99213 OFFICE O/P EST LOW 20 MIN: CPT | Performed by: INTERNAL MEDICINE

## 2022-12-06 PROCEDURE — 1036F TOBACCO NON-USER: CPT | Performed by: INTERNAL MEDICINE

## 2022-12-06 PROCEDURE — G8417 CALC BMI ABV UP PARAM F/U: HCPCS | Performed by: INTERNAL MEDICINE

## 2022-12-06 PROCEDURE — 99211 OFF/OP EST MAY X REQ PHY/QHP: CPT

## 2022-12-06 NOTE — PROGRESS NOTES
CC -   Follow up of: Wants to lose weight for health concerns. Would like to be <140 lbs    BACKGROUND -   Last visit: 9/22/22  First visit: 9/22/22    Obesity (all weight in lbs)  Began in 2018  Initial BMI 38.51, Wt 231, Ht 65\"  HS Grad wt 145   Lowest   wt 140   Highest  wt 231.4  Pattern of wt gain: gradual  Wt change past yr: +24 lbs  Most wt lost: ~20 lbs (last summer)  Other diets attempted: changed diet - more salads/fruit, walking    Desire to lose weight: 10/10    Initial Diet:    Number of meals per day - 3 (B-L-D)    Number of snacks per day - 1 (night snack)    Meal volume - 12\" plate,  never seconds    Fast food/convenience store - 0-1x/week    Restaurants (not fast food) - 0-1x/week   Sweets - 7d/week (after dinner)   Chips - 7d/week   Crackers/pretzels - 7d/week (triscuits)   Nuts - 0d/week   Peanut Butter - 0d/week   Popcorn - 0d/week   Dried fruit - 0d/week   Whole fruit - 2d/week   Breakfast cereal - 0d/week   Granola/Protein/Energy bar - 0d/week   Sugar sweetened beverages - gingerale ~1x/wk. Orange juice about 1xwk, no coffee lately, tea (calorie free), no energy drink   Protein - No supplements currently   Fiber - No supplements    Wt effect of HR foods = 2100 Bk/wk = 300 Bk/d= 14% DEN = 31 lb/year. Initial Exercise:    Gym membership - no (has treadmill)    Walking - no    Running - no    Resistance - no    Aerobic class - no     Bedtime 10-10:30->~4:50->7/8am. Daytime tiredness+.  No daytime naps.  ______________________    [facility] -  Past Medical History:   Diagnosis Date    ADHD     Anxiety     Depression     GERD (gastroesophageal reflux disease)     Granuloma annulare     Kidney stone     Migraine    Chronic Inflammatory Response Syndrome (??)  Past Surgical History:   Procedure Laterality Date    FINGER SURGERY Left 08/05/2015    Dr. Christiano Barahona, ring finger; path report = granuloma annulare    TONSILLECTOMY       Prior to Admission medications    Medication Sig Start Date End Date Taking? Authorizing Provider   topiramate (TOPAMAX) 200 MG tablet TAKE 1 TABLET BY MOUTH EVERYDAY AT BEDTIME 3/14/22   Historical Provider, MD Popeye Villeda 72 MCG CAPS capsule  8/25/21   Historical Provider, MD   VYVANSE 50 MG capsule TAKE 1 CAPSULE BY MOUTH EVERY MORNING 9/10/21   Historical Provider, MD   traZODone (DESYREL) 100 MG tablet TAKE 1 TABLET BY MOUTH EVERY DAY AT BEDTIME AS NEEDED 9/27/21   Historical Provider, MD   topiramate (TOPAMAX) 100 MG tablet TAKE 1 TABLET BY MOUTH THREE TIMES A DAY 8/26/20   Historical Provider, MD   cloNIDine (CATAPRES) 0.2 MG tablet 0.2 mg 2 po @HS 8/28/20   Historical Provider, MD   ARIPiprazole (ABILIFY) 15 MG tablet Take 15 mg by mouth daily     Historical Provider, MD   tretinoin (RETIN-A) 0.025 % cream TAKE 1 APPLICATION TOPICALLY ONCE PER DAY FOR 1 MONTH APPLY TO FACE EVERY NIGHT 12/14/19   Historical Provider, MD   fluvoxaMINE (LUVOX) 100 MG tablet Take 150 mg by mouth nightly 3 po qd at hs     Historical Provider, MD   Potassium Citrate ER 15 MEQ (1620 MG) TBCR Take 15 mEq by mouth 4 tabs po qd    Historical Provider, MD     10/25/22: Taking MVI daily    Allergies: Allergies   Allergen Reactions    Latex Other (See Comments) and Rash     Other reaction(s): Makes skin bleed  On contact      Cefaclor     Penicillin G     Penicillins     Seasonal Other (See Comments)    Sulfa Antibiotics      Social history: smoking: in college (not anymore) ; Alcohol: occasionally , work: not currently (d/t CIRS). ROS -  Card - no CP  GI - no N/V/D/C    PE -  Gen : BP (!) 102/59 (Site: Left Upper Arm, Position: Sitting, Cuff Size: Large Adult)   Pulse 76   Temp 97.5 °F (36.4 °C) (Temporal)   Ht 5' 5\" (1.651 m)   Wt 213 lb (96.6 kg)   BMI 35.45 kg/m²    WN, WD, NAD  Lung: Nml resp effort, CTA B/L  Heart:  RRR w/o MGR, no LE pitting edema b/l  Psych: Normal mood   Full affect  Neuro:  Moves all ext well  ______________________    HISTORY & ASSESSMENT/PLAN -     Problem 1  - Fatigue/tiredness   HPI   - does feel tired lizett after workout but not much as prior, sleep is good  Assessment  - similar to prior  Plan   - Chronic inflammatory response syndrome may be contributing, . Weight reduction can help if weight is contributing to fatigue. Weight reduction per plan below    Problem 2  - Obesity   HPI   - See above Background for description    Weight  Date    231.4  22    220.0  10/25/22    213.0  22     Total weight change to date: -18.4 lbs. Average daily energy variance:  2022 - 10/25/2022: -9.8 lbs (47622 Bk)/32 d = -1072 Bk/d deficit. 10/25/2022 - 2022: -7.0 lbs (94969 Bk)/42 d = -583 Bk/d deficit. Patient's estimated daily energy need (DEN) = 2101 Bk/d = 01699 Bk/wk    Update:  Calorie monitorind/wk, usual intake <1300 Bk/d  Sweets: 1d/month (thanksgiving pie)  SSB: none (sugar free lemonade)  Restaurant food: 3x/month  Appetite suppressant: None (on Vyvanse and Topiramate)  Home BP monitoring: NA  Protein: ~50-80g/d  Fiber: no supplement currently, too much fiber upsets her stomach  Water: increasing - 2L/d current goal  Activities: walking daily (smt 10k steps)      Assessment  - Uncontrolled    Plan   - She is doing very well with current plan, and feels she can continue. She is on Vyvanse (for ADHD) and Topiramate (for migraine).  We planned to continue, as follows  Patient Instructions   Rules:  Count every calorie every day  Limit sweets to one day per month  Limit chips/crackers/pretzels/nuts/popcorn to 150 bk/day  Eliminate all sugar sweetened beverages (including fruit juice)  Limit restaurants (including fast food and food from a convenience store) to one time every two weeks while in town    Requirements:  Make sure protein intake is at least 70 grams per day   Make sure that fiber intake is at least 25 grams per day  Take one multivitamin every day    Targets:  Limit calorie intake to 1400 calories/day  Walk 30 minutes daily   Avoid eating 2 hours within bedtime. Tips:  Do not eat outside of the dining room or the kitchen  Do not eat while watching TV, videos, working on the computer or using a smart phone  Do not eat food out of a multi-serving bag or container. Follow up in about 6-8 weeks. Total time spent on this encounter: 20 minutes. Mariposa Jamison MD  Internal Medicine/Obesity Medicine  12/6/2022.

## 2022-12-12 LAB — MAMMOGRAPHY, EXTERNAL: NORMAL

## 2022-12-19 LAB — PAP SMEAR, EXTERNAL: NEGATIVE

## 2023-01-09 ENCOUNTER — OFFICE VISIT (OUTPATIENT)
Dept: FAMILY MEDICINE CLINIC | Age: 43
End: 2023-01-09
Payer: COMMERCIAL

## 2023-01-09 VITALS
SYSTOLIC BLOOD PRESSURE: 90 MMHG | HEART RATE: 90 BPM | HEIGHT: 65 IN | TEMPERATURE: 97.5 F | WEIGHT: 215 LBS | DIASTOLIC BLOOD PRESSURE: 72 MMHG | OXYGEN SATURATION: 98 % | RESPIRATION RATE: 16 BRPM | BODY MASS INDEX: 35.82 KG/M2

## 2023-01-09 DIAGNOSIS — Z00.01 ENCOUNTER FOR GENERAL ADULT MEDICAL EXAMINATION WITH ABNORMAL FINDINGS: Primary | ICD-10-CM

## 2023-01-09 DIAGNOSIS — Z00.01 ENCOUNTER FOR GENERAL ADULT MEDICAL EXAMINATION WITH ABNORMAL FINDINGS: ICD-10-CM

## 2023-01-09 LAB
ALBUMIN SERPL-MCNC: 4.4 G/DL (ref 3.5–5.2)
ALP BLD-CCNC: 108 U/L (ref 35–104)
ALT SERPL-CCNC: 10 U/L (ref 0–32)
AMORPHOUS: ABNORMAL
ANION GAP SERPL CALCULATED.3IONS-SCNC: 13 MMOL/L (ref 7–16)
AST SERPL-CCNC: 16 U/L (ref 0–31)
BACTERIA: ABNORMAL /HPF
BASOPHILS ABSOLUTE: 0.04 E9/L (ref 0–0.2)
BASOPHILS RELATIVE PERCENT: 0.7 % (ref 0–2)
BILIRUB SERPL-MCNC: 0.8 MG/DL (ref 0–1.2)
BILIRUBIN URINE: NEGATIVE
BLOOD, URINE: ABNORMAL
BUN BLDV-MCNC: 18 MG/DL (ref 6–20)
CALCIUM SERPL-MCNC: 9.8 MG/DL (ref 8.6–10.2)
CHLORIDE BLD-SCNC: 110 MMOL/L (ref 98–107)
CHOLESTEROL, TOTAL: 181 MG/DL (ref 0–199)
CLARITY: ABNORMAL
CO2: 18 MMOL/L (ref 22–29)
COLOR: YELLOW
CREAT SERPL-MCNC: 1.2 MG/DL (ref 0.5–1)
CRYSTALS, UA: ABNORMAL /HPF
EOSINOPHILS ABSOLUTE: 0.1 E9/L (ref 0.05–0.5)
EOSINOPHILS RELATIVE PERCENT: 1.8 % (ref 0–6)
GFR SERPL CREATININE-BSD FRML MDRD: 58 ML/MIN/1.73
GLUCOSE FASTING: 97 MG/DL (ref 74–99)
GLUCOSE URINE: NEGATIVE MG/DL
HCT VFR BLD CALC: 46.1 % (ref 34–48)
HDLC SERPL-MCNC: 65 MG/DL
HEMOGLOBIN: 15.5 G/DL (ref 11.5–15.5)
IMMATURE GRANULOCYTES #: 0.02 E9/L
IMMATURE GRANULOCYTES %: 0.4 % (ref 0–5)
KETONES, URINE: NEGATIVE MG/DL
LDL CHOLESTEROL CALCULATED: 104 MG/DL (ref 0–99)
LEUKOCYTE ESTERASE, URINE: NEGATIVE
LYMPHOCYTES ABSOLUTE: 1.77 E9/L (ref 1.5–4)
LYMPHOCYTES RELATIVE PERCENT: 32 % (ref 20–42)
MCH RBC QN AUTO: 32.1 PG (ref 26–35)
MCHC RBC AUTO-ENTMCNC: 33.6 % (ref 32–34.5)
MCV RBC AUTO: 95.4 FL (ref 80–99.9)
MONOCYTES ABSOLUTE: 0.48 E9/L (ref 0.1–0.95)
MONOCYTES RELATIVE PERCENT: 8.7 % (ref 2–12)
NEUTROPHILS ABSOLUTE: 3.12 E9/L (ref 1.8–7.3)
NEUTROPHILS RELATIVE PERCENT: 56.4 % (ref 43–80)
NITRITE, URINE: NEGATIVE
PDW BLD-RTO: 13 FL (ref 11.5–15)
PH UA: 7.5 (ref 5–9)
PLATELET # BLD: 200 E9/L (ref 130–450)
PMV BLD AUTO: 11.8 FL (ref 7–12)
POTASSIUM SERPL-SCNC: 4.3 MMOL/L (ref 3.5–5)
PROTEIN UA: NEGATIVE MG/DL
RBC # BLD: 4.83 E12/L (ref 3.5–5.5)
RBC UA: ABNORMAL /HPF (ref 0–2)
SODIUM BLD-SCNC: 141 MMOL/L (ref 132–146)
SPECIFIC GRAVITY UA: 1.01 (ref 1–1.03)
TOTAL PROTEIN: 6.7 G/DL (ref 6.4–8.3)
TRIGL SERPL-MCNC: 61 MG/DL (ref 0–149)
TSH SERPL DL<=0.05 MIU/L-ACNC: 1.43 UIU/ML (ref 0.27–4.2)
UROBILINOGEN, URINE: 0.2 E.U./DL
VLDLC SERPL CALC-MCNC: 12 MG/DL
WBC # BLD: 5.5 E9/L (ref 4.5–11.5)
WBC UA: ABNORMAL /HPF (ref 0–5)

## 2023-01-09 PROCEDURE — 81003 URINALYSIS AUTO W/O SCOPE: CPT | Performed by: FAMILY MEDICINE

## 2023-01-09 PROCEDURE — 99396 PREV VISIT EST AGE 40-64: CPT | Performed by: FAMILY MEDICINE

## 2023-01-09 PROCEDURE — G8484 FLU IMMUNIZE NO ADMIN: HCPCS | Performed by: FAMILY MEDICINE

## 2023-01-09 ASSESSMENT — ENCOUNTER SYMPTOMS
SINUS PAIN: 0
SORE THROAT: 0
TROUBLE SWALLOWING: 0
EYE REDNESS: 0
BLOOD IN STOOL: 0
PHOTOPHOBIA: 0
ALLERGIC/IMMUNOLOGIC NEGATIVE: 1
SHORTNESS OF BREATH: 0
NAUSEA: 0
VOMITING: 0
CHEST TIGHTNESS: 0
DIARRHEA: 0
BACK PAIN: 0
ABDOMINAL PAIN: 0
EYE DISCHARGE: 0
COUGH: 0
EYE PAIN: 0

## 2023-01-09 NOTE — PROGRESS NOTES
23  Nathaly Teresa : 1980 Sex: female  Age: 43 y.o. Assessment and Plan:  There are no diagnoses linked to this encounter. No follow-ups on file. No chief complaint on file.       HPI    Review of Systems      Current Outpatient Medications:     topiramate (TOPAMAX) 200 MG tablet, TAKE 1 TABLET BY MOUTH EVERYDAY AT BEDTIME, Disp: , Rfl:     LINZESS 72 MCG CAPS capsule, , Disp: , Rfl:     VYVANSE 50 MG capsule, TAKE 1 CAPSULE BY MOUTH EVERY MORNING, Disp: , Rfl:     traZODone (DESYREL) 100 MG tablet, TAKE 1 TABLET BY MOUTH EVERY DAY AT BEDTIME AS NEEDED, Disp: , Rfl:     topiramate (TOPAMAX) 100 MG tablet, TAKE 1 TABLET BY MOUTH THREE TIMES A DAY, Disp: , Rfl:     cloNIDine (CATAPRES) 0.2 MG tablet, 0.2 mg 2 po @HS, Disp: , Rfl:     ARIPiprazole (ABILIFY) 15 MG tablet, Take 15 mg by mouth daily , Disp: , Rfl:     tretinoin (RETIN-A) 0.025 % cream, TAKE 1 APPLICATION TOPICALLY ONCE PER DAY FOR 1 MONTH APPLY TO FACE EVERY NIGHT, Disp: , Rfl:     fluvoxaMINE (LUVOX) 100 MG tablet, Take 150 mg by mouth nightly 3 po qd at hs , Disp: , Rfl:     Potassium Citrate ER 15 MEQ (1620 MG) TBCR, Take 15 mEq by mouth 4 tabs po qd, Disp: , Rfl:   Allergies   Allergen Reactions    Latex Other (See Comments) and Rash     Other reaction(s): Makes skin bleed  On contact      Cefaclor     Penicillin G     Penicillins     Seasonal Other (See Comments)    Sulfa Antibiotics        Past Medical History:   Diagnosis Date    ADHD     Anxiety     Depression     GERD (gastroesophageal reflux disease)     Granuloma annulare     Kidney stone     Migraine      Past Surgical History:   Procedure Laterality Date    FINGER SURGERY Left 2015    Dr. Mallory Navarrete, ring finger; path report = granuloma annulare    TONSILLECTOMY       Family History   Problem Relation Age of Onset    Uterine Cancer Mother     Thyroid Disease Mother     Osteoporosis Mother     No Known Problems Father      Social History     Socioeconomic History    Marital status:      Spouse name: Not on file    Number of children: Not on file    Years of education: Not on file    Highest education level: Not on file   Occupational History    Occupation: previous phlebotomist, teacher   Tobacco Use    Smoking status: Never    Smokeless tobacco: Never   Vaping Use    Vaping Use: Never used   Substance and Sexual Activity    Alcohol use: Yes     Comment: rarely    Drug use: No     Comment: remote marijuana use    Sexual activity: Not on file   Other Topics Concern    Not on file   Social History Narrative    Not on file     Social Determinants of Health     Financial Resource Strain: Not on file   Food Insecurity: Not on file   Transportation Needs: Not on file   Physical Activity: Not on file   Stress: Not on file   Social Connections: Not on file   Intimate Partner Violence: Not on file   Housing Stability: Not on file       Vitals:    01/09/23 1314   BP: 90/72   Pulse: 90   Resp: 16   Temp: 97.5 °F (36.4 °C)   TempSrc: Temporal   SpO2: 98%   Weight: 215 lb (97.5 kg)   Height: 5' 5\" (1.651 m)       Physical Exam        Seen By:  Gloria Bernal DO

## 2023-01-09 NOTE — PROGRESS NOTES
23  Sulaiman Christy : 1980 Sex: female  Age: 43 y.o. Assessment and Plan:  Daniella Dior was seen today for annual exam.    Diagnoses and all orders for this visit:    Encounter for general adult medical examination with abnormal findings  -     CBC with Auto Differential; Future  -     Comprehensive Metabolic Panel, Fasting; Future  -     Lipid Panel; Future  -     TSH; Future  -     Urinalysis; Future      Return in about 6 months (around 2023). Chief Complaint   Patient presents with    Annual Exam         Here for well visit. She is on many medications for GI, depression, migraine headaches. Medications are effective and well-tolerated. Is due for fasting blood work. Review of Systems   Constitutional:  Negative for appetite change, fatigue and unexpected weight change. HENT:  Negative for congestion, ear pain, hearing loss, sinus pain, sore throat and trouble swallowing. Eyes:  Negative for photophobia, pain, discharge and redness. Respiratory:  Negative for cough, chest tightness and shortness of breath. Cardiovascular:  Negative for chest pain, palpitations and leg swelling. Gastrointestinal:  Negative for abdominal pain, blood in stool, diarrhea, nausea and vomiting. Endocrine: Negative. Genitourinary:  Negative for dysuria, flank pain, frequency and hematuria. Musculoskeletal:  Negative for arthralgias, back pain, joint swelling and myalgias. Skin: Negative. Allergic/Immunologic: Negative. Neurological:  Negative for dizziness, seizures, syncope, weakness, light-headedness, numbness and headaches. Hematological:  Negative for adenopathy. Does not bruise/bleed easily. Psychiatric/Behavioral: Negative.          Current Outpatient Medications:     topiramate (TOPAMAX) 200 MG tablet, TAKE 1 TABLET BY MOUTH EVERYDAY AT BEDTIME, Disp: , Rfl:     LINZESS 72 MCG CAPS capsule, , Disp: , Rfl:     VYVANSE 50 MG capsule, TAKE 1 CAPSULE BY MOUTH EVERY MORNING, Disp: , Rfl:     traZODone (DESYREL) 100 MG tablet, TAKE 1 TABLET BY MOUTH EVERY DAY AT BEDTIME AS NEEDED, Disp: , Rfl:     topiramate (TOPAMAX) 100 MG tablet, TAKE 1 TABLET BY MOUTH THREE TIMES A DAY, Disp: , Rfl:     cloNIDine (CATAPRES) 0.2 MG tablet, 0.2 mg 2 po @HS, Disp: , Rfl:     ARIPiprazole (ABILIFY) 15 MG tablet, Take 15 mg by mouth daily , Disp: , Rfl:     tretinoin (RETIN-A) 0.025 % cream, TAKE 1 APPLICATION TOPICALLY ONCE PER DAY FOR 1 MONTH APPLY TO FACE EVERY NIGHT, Disp: , Rfl:     fluvoxaMINE (LUVOX) 100 MG tablet, Take 150 mg by mouth nightly 3 po qd at hs , Disp: , Rfl:     Potassium Citrate ER 15 MEQ (1620 MG) TBCR, Take 15 mEq by mouth 4 tabs po qd, Disp: , Rfl:   Allergies   Allergen Reactions    Latex Other (See Comments) and Rash     Other reaction(s): Makes skin bleed  On contact      Cefaclor     Penicillin G     Penicillins     Seasonal Other (See Comments)    Sulfa Antibiotics        Past Medical History:   Diagnosis Date    ADHD     Anxiety     Depression     GERD (gastroesophageal reflux disease)     Granuloma annulare     Kidney stone     Migraine      Past Surgical History:   Procedure Laterality Date    FINGER SURGERY Left 08/05/2015    Dr. Tiarra Dotson, ring finger; path report = granuloma annulare    TONSILLECTOMY       Family History   Problem Relation Age of Onset    Uterine Cancer Mother     Thyroid Disease Mother     Osteoporosis Mother     No Known Problems Father      Social History     Socioeconomic History    Marital status:      Spouse name: Not on file    Number of children: Not on file    Years of education: Not on file    Highest education level: Not on file   Occupational History    Occupation: previous phlebotomist, teacher   Tobacco Use    Smoking status: Never    Smokeless tobacco: Never   Vaping Use    Vaping Use: Never used   Substance and Sexual Activity    Alcohol use: Yes     Comment: rarely    Drug use: No     Comment: remote marijuana use Sexual activity: Not on file   Other Topics Concern    Not on file   Social History Narrative    Not on file     Social Determinants of Health     Financial Resource Strain: Not on file   Food Insecurity: Not on file   Transportation Needs: Not on file   Physical Activity: Not on file   Stress: Not on file   Social Connections: Not on file   Intimate Partner Violence: Not on file   Housing Stability: Not on file       Vitals:    01/09/23 1314   BP: 90/72   Pulse: 90   Resp: 16   Temp: 97.5 °F (36.4 °C)   TempSrc: Temporal   SpO2: 98%   Weight: 215 lb (97.5 kg)   Height: 5' 5\" (1.651 m)       Physical Exam  Vitals and nursing note reviewed. Constitutional:       Appearance: She is well-developed. HENT:      Head: Atraumatic. Right Ear: External ear normal.      Left Ear: External ear normal.      Nose: Nose normal.      Mouth/Throat:      Pharynx: No oropharyngeal exudate. Eyes:      Conjunctiva/sclera: Conjunctivae normal.      Pupils: Pupils are equal, round, and reactive to light. Neck:      Thyroid: No thyromegaly. Trachea: No tracheal deviation. Cardiovascular:      Rate and Rhythm: Normal rate and regular rhythm. Heart sounds: No murmur heard. No friction rub. No gallop. Pulmonary:      Effort: Pulmonary effort is normal. No respiratory distress. Breath sounds: Normal breath sounds. Abdominal:      General: Bowel sounds are normal.      Palpations: Abdomen is soft. Musculoskeletal:         General: No tenderness or deformity. Normal range of motion. Cervical back: Normal range of motion and neck supple. Lymphadenopathy:      Cervical: No cervical adenopathy. Skin:     General: Skin is warm and dry. Capillary Refill: Capillary refill takes less than 2 seconds. Findings: No rash. Neurological:      Mental Status: She is alert and oriented to person, place, and time. Sensory: No sensory deficit. Motor: No abnormal muscle tone.       Coordination: Coordination normal.      Deep Tendon Reflexes: Reflexes normal.           Seen By:  Deonte Begin, DO

## 2023-02-02 ENCOUNTER — OFFICE VISIT (OUTPATIENT)
Dept: BARIATRICS/WEIGHT MGMT | Age: 43
End: 2023-02-02
Payer: COMMERCIAL

## 2023-02-02 VITALS
WEIGHT: 215 LBS | TEMPERATURE: 97.3 F | SYSTOLIC BLOOD PRESSURE: 96 MMHG | DIASTOLIC BLOOD PRESSURE: 57 MMHG | BODY MASS INDEX: 35.82 KG/M2 | HEART RATE: 75 BPM | HEIGHT: 65 IN

## 2023-02-02 DIAGNOSIS — R53.82 CHRONIC FATIGUE: Primary | ICD-10-CM

## 2023-02-02 DIAGNOSIS — E66.09 CLASS 2 OBESITY DUE TO EXCESS CALORIES WITHOUT SERIOUS COMORBIDITY WITH BODY MASS INDEX (BMI) OF 35.0 TO 35.9 IN ADULT: ICD-10-CM

## 2023-02-02 PROCEDURE — G8484 FLU IMMUNIZE NO ADMIN: HCPCS | Performed by: INTERNAL MEDICINE

## 2023-02-02 PROCEDURE — 99214 OFFICE O/P EST MOD 30 MIN: CPT | Performed by: INTERNAL MEDICINE

## 2023-02-02 PROCEDURE — G8428 CUR MEDS NOT DOCUMENT: HCPCS | Performed by: INTERNAL MEDICINE

## 2023-02-02 PROCEDURE — 99211 OFF/OP EST MAY X REQ PHY/QHP: CPT

## 2023-02-02 PROCEDURE — G8417 CALC BMI ABV UP PARAM F/U: HCPCS | Performed by: INTERNAL MEDICINE

## 2023-02-02 PROCEDURE — 1036F TOBACCO NON-USER: CPT | Performed by: INTERNAL MEDICINE

## 2023-02-02 RX ORDER — PHENTERMINE HYDROCHLORIDE 37.5 MG/1
37.5 TABLET ORAL
Qty: 30 TABLET | Refills: 0 | Status: SHIPPED | OUTPATIENT
Start: 2023-02-02 | End: 2023-03-04

## 2023-02-02 NOTE — PROGRESS NOTES
CC -   Follow up of: Wants to lose weight for health concerns. Would like to be <140 lbs    BACKGROUND -   Last visit: 12/6/22  First visit: 9/22/22    Obesity (all weight in lbs)  Began in 2018  Initial BMI 38.51, Wt 231, Ht 65\"  HS Grad wt 145   Lowest   wt 140   Highest  wt 231.4  Pattern of wt gain: gradual  Wt change past yr: +24 lbs  Most wt lost: ~20 lbs (last summer)  Other diets attempted: changed diet - more salads/fruit, walking    Desire to lose weight: 10/10    Initial Diet:    Number of meals per day - 3 (B-L-D)    Number of snacks per day - 1 (night snack)    Meal volume - 12\" plate,  never seconds    Fast food/convenience store - 0-1x/week    Restaurants (not fast food) - 0-1x/week   Sweets - 7d/week (after dinner)   Chips - 7d/week   Crackers/pretzels - 7d/week (triscuits)   Nuts - 0d/week   Peanut Butter - 0d/week   Popcorn - 0d/week   Dried fruit - 0d/week   Whole fruit - 2d/week   Breakfast cereal - 0d/week   Granola/Protein/Energy bar - 0d/week   Sugar sweetened beverages - gingerale ~1x/wk. Orange juice about 1xwk, no coffee lately, tea (calorie free), no energy drink   Protein - No supplements currently   Fiber - No supplements    Wt effect of HR foods = 2100 Bk/wk = 300 Bk/d= 14% DEN = 31 lb/year. Initial Exercise:    Gym membership - no (has treadmill)    Walking - no    Running - no    Resistance - no    Aerobic class - no     Bedtime 10-10:30->~4:50->7/8am. Daytime tiredness+.  No daytime naps.  ______________________    [facility] -  Past Medical History:   Diagnosis Date    ADHD     Anxiety     Depression     GERD (gastroesophageal reflux disease)     Granuloma annulare     Kidney stone     Migraine    Chronic Inflammatory Response Syndrome (??)  Past Surgical History:   Procedure Laterality Date    FINGER SURGERY Left 08/05/2015    Dr. Christiano Barahona, ring finger; path report = granuloma annulare    TONSILLECTOMY       Prior to Admission medications    Medication Sig Start Date End Date Taking? Authorizing Provider   topiramate (TOPAMAX) 200 MG tablet TAKE 1 TABLET BY MOUTH EVERYDAY AT BEDTIME 3/14/22   Historical Provider, MD   LINZESS 72 MCG CAPS capsule  8/25/21   Historical Provider, MD   VYVANSE 50 MG capsule TAKE 1 CAPSULE BY MOUTH EVERY MORNING 9/10/21   Historical Provider, MD   traZODone (DESYREL) 100 MG tablet TAKE 1 TABLET BY MOUTH EVERY DAY AT BEDTIME AS NEEDED 9/27/21   Historical Provider, MD   topiramate (TOPAMAX) 100 MG tablet TAKE 1 TABLET BY MOUTH THREE TIMES A DAY 8/26/20   Historical Provider, MD   cloNIDine (CATAPRES) 0.2 MG tablet 0.2 mg 2 po @HS 8/28/20   Historical Provider, MD   ARIPiprazole (ABILIFY) 15 MG tablet Take 15 mg by mouth daily     Historical Provider, MD   tretinoin (RETIN-A) 0.025 % cream TAKE 1 APPLICATION TOPICALLY ONCE PER DAY FOR 1 MONTH APPLY TO FACE EVERY NIGHT 12/14/19   Historical Provider, MD   fluvoxaMINE (LUVOX) 100 MG tablet Take 150 mg by mouth nightly 3 po qd at hs     Historical Provider, MD   Potassium Citrate ER 15 MEQ (1620 MG) TBCR Take 15 mEq by mouth 4 tabs po qd    Historical Provider, MD     10/25/22: Taking MVI daily    Allergies:   Allergies   Allergen Reactions    Latex Other (See Comments) and Rash     Other reaction(s): Makes skin bleed  On contact      Cefaclor     Penicillin G     Penicillins     Seasonal Other (See Comments)    Sulfa Antibiotics      Social history: smoking: in college (not anymore) ; Alcohol: occasionally , work: not currently (d/t [employer]).    ROS -  Card - no CP  GI - no N/V/D/C    PE -  Gen : BP (!) 96/57 (Site: Left Upper Arm, Position: Sitting, Cuff Size: Large Adult)   Pulse 75   Temp 97.3 °F (36.3 °C) (Temporal)   Ht 5' 5\" (1.651 m)   Wt 215 lb (97.5 kg)   BMI 35.78 kg/m²    WN, WD, NAD  Lung: Nml resp effort, CTA B/L  Heart:  RRR w/o MGR, no LE pitting edema b/l  Psych: Normal mood   Full affect  Neuro: Moves all ext well  ______________________    HISTORY & ASSESSMENT/PLAN -     Problem 1  -  Fatigue/tiredness   HPI   - does feel tired lizett after workout but not much as prior, sleep is good  Assessment  - similar to prior  Plan   - Chronic inflammatory response syndrome may be contributing, . Weight reduction can help if weight is contributing to fatigue. Weight reduction per plan below    Problem 2  - Obesity   HPI   - See above Background for description    Weight  Date    231.4  9/22/22    220.0  10/25/22    213.0  12/6/22    215.0  2/2/23         Total weight change to date: -16.4 lbs. Average daily energy variance:  9/22/2022 - 10/25/2022: -9.8 lbs (21296 Bk)/32 d = -1072 Bk/d deficit. 10/25/2022 - 12/6/2022: -7.0 lbs (73848 Bk)/42 d = -583 Bk/d deficit. 12/6/2022 - 2/2/2023: +2.0 lbs (7000 Bk)/58 d = +121 Bk/d excess. Patient's estimated daily energy need (DEN) = 2101 Bk/d = 38581 Bk/wk    Update:  Calorie monitoring: off and on, usual intake <1300 Bk/d (Sid, birthday, had a minor surgery as well. Sweets: some cookies over Sid  SSB: none (sugar free lemonade)  Restaurant food: x/month  Appetite suppressant: None (on Vyvanse and Topiramate)  Home BP monitoring: NA  Protein: ~50-80g/d  Fiber: no supplement currently, too much fiber upsets her stomach  Water: increasing - 2L/d current goal  Activities: walking daily (smt 10k steps)      Assessment  - Uncontrolled    Plan   - she has been doing ok other than she has not been able to continue low calorie diet everyday, or most of the last 6 weeks. She feels she needs an appetite suppressants. She states that she is off of Vyvanse. We planned to start Phentermine for appetite suppression, adverse effects and alternatives discussed.  Planned as follows  Patient Instructions   Rules:  Count every calorie every day  Limit sweets to one day per month  Limit chips/crackers/pretzels/nuts/popcorn to 150 bk/day  Eliminate all sugar sweetened beverages (including fruit juice)  Limit restaurants (including fast food and food from a convenience store) to one time every two weeks while in town    Requirements:  Make sure protein intake is at least 70 grams per day   Make sure that fiber intake is at least 25 grams per day  Take one multivitamin every day    Targets:  Limit calorie intake to 1400 calories/day  Walk 30 minutes daily   Avoid eating 2 hours within bedtime. Tips:  Do not eat outside of the dining room or the kitchen  Do not eat while watching TV, videos, working on the computer or using a smart phone  Do not eat food out of a multi-serving bag or container. Phentermine:  Take phentermine 37.5 mg, one-half to one tablet daily as needed for appetite suppression. Take each dose 30-90 min before effect will be needed. While taking phentermine, check the Blood Pressure every morning and every evening. If the systolic BP is >525 mmHg, the diastolic BP is >51 mm/Hg or the heart rate is > 100 beats per minute, do not take phentermine that day. If the systolic BP is consistently >155 mmHg, the diastolic BP is consistently above 90 mm/Hg or the heart rate is consistently > 100 beats per minute, then stop taking phentermine altogether. If the systolic BP >777 mmHg or the diastolic BP is >415 mmHg (even if it is only once), then phentermine should be stopped altogether without proving that any of these are consistently elevated. Follow up in 30 days. Medication management: Phentermine    Corby Hopper MD  Internal Medicine/Obesity Medicine  2/2/2023.

## 2023-02-02 NOTE — PATIENT INSTRUCTIONS
Rules:  Count every calorie every day  Limit sweets to one day per month  Limit chips/crackers/pretzels/nuts/popcorn to 150 jose francisco/day  Eliminate all sugar sweetened beverages (including fruit juice)  Limit restaurants (including fast food and food from a convenience store) to one time every two weeks while in town    Requirements:  Make sure protein intake is at least 70 grams per day   Make sure that fiber intake is at least 25 grams per day  Take one multivitamin every day    Targets:  Limit calorie intake to 1400 calories/day  Walk 30 minutes daily   Avoid eating 2 hours within bedtime. Tips:  Do not eat outside of the dining room or the kitchen  Do not eat while watching TV, videos, working on the computer or using a smart phone  Do not eat food out of a multi-serving bag or container. Phentermine:  Take phentermine 37.5 mg, one-half to one tablet daily as needed for appetite suppression. Take each dose 30-90 min before effect will be needed. While taking phentermine, check the Blood Pressure every morning and every evening. If the systolic BP is >336 mmHg, the diastolic BP is >28 mm/Hg or the heart rate is > 100 beats per minute, do not take phentermine that day. If the systolic BP is consistently >155 mmHg, the diastolic BP is consistently above 90 mm/Hg or the heart rate is consistently > 100 beats per minute, then stop taking phentermine altogether. If the systolic BP >708 mmHg or the diastolic BP is >752 mmHg (even if it is only once), then phentermine should be stopped altogether without proving that any of these are consistently elevated. Follow up in 30 days.

## 2023-03-07 ENCOUNTER — OFFICE VISIT (OUTPATIENT)
Dept: BARIATRICS/WEIGHT MGMT | Age: 43
End: 2023-03-07
Payer: COMMERCIAL

## 2023-03-07 VITALS
SYSTOLIC BLOOD PRESSURE: 101 MMHG | HEIGHT: 65 IN | DIASTOLIC BLOOD PRESSURE: 61 MMHG | HEART RATE: 75 BPM | WEIGHT: 211.8 LBS | TEMPERATURE: 97.6 F | BODY MASS INDEX: 35.29 KG/M2

## 2023-03-07 DIAGNOSIS — R53.82 CHRONIC FATIGUE: Primary | ICD-10-CM

## 2023-03-07 DIAGNOSIS — E66.09 CLASS 2 OBESITY DUE TO EXCESS CALORIES WITHOUT SERIOUS COMORBIDITY WITH BODY MASS INDEX (BMI) OF 35.0 TO 35.9 IN ADULT: ICD-10-CM

## 2023-03-07 PROCEDURE — G8428 CUR MEDS NOT DOCUMENT: HCPCS | Performed by: INTERNAL MEDICINE

## 2023-03-07 PROCEDURE — G8484 FLU IMMUNIZE NO ADMIN: HCPCS | Performed by: INTERNAL MEDICINE

## 2023-03-07 PROCEDURE — 99211 OFF/OP EST MAY X REQ PHY/QHP: CPT

## 2023-03-07 PROCEDURE — 1036F TOBACCO NON-USER: CPT | Performed by: INTERNAL MEDICINE

## 2023-03-07 PROCEDURE — G8417 CALC BMI ABV UP PARAM F/U: HCPCS | Performed by: INTERNAL MEDICINE

## 2023-03-07 PROCEDURE — 99214 OFFICE O/P EST MOD 30 MIN: CPT | Performed by: INTERNAL MEDICINE

## 2023-03-07 RX ORDER — PHENTERMINE HYDROCHLORIDE 37.5 MG/1
37.5 TABLET ORAL
Qty: 30 TABLET | Refills: 0 | Status: SHIPPED | OUTPATIENT
Start: 2023-03-07 | End: 2023-04-06

## 2023-03-07 NOTE — PATIENT INSTRUCTIONS
Rules:  Count every calorie every day  Limit sweets to one day per month  Limit chips/crackers/pretzels/nuts/popcorn to 150 jose francisco/day  Eliminate all sugar sweetened beverages (including fruit juice)  Limit restaurants (including fast food and food from a convenience store) to one time every two weeks while in town    Requirements:  Make sure protein intake is at least 70 grams per day   Make sure that fiber intake is at least 25 grams per day  Take one multivitamin every day    Targets:  Limit calorie intake to 1400 calories/day  Walk 30 minutes daily   Avoid eating 2 hours within bedtime. Tips:  Do not eat outside of the dining room or the kitchen  Do not eat while watching TV, videos, working on the computer or using a smart phone  Do not eat food out of a multi-serving bag or container. Phentermine:  Take phentermine 37.5 mg, one-half to one tablet daily as needed for appetite suppression. Take each dose 30-90 min before effect will be needed. While taking phentermine, check the Blood Pressure every morning and every evening. If the systolic BP is >176 mmHg, the diastolic BP is >57 mm/Hg or the heart rate is > 100 beats per minute, do not take phentermine that day. If the systolic BP is consistently >155 mmHg, the diastolic BP is consistently above 90 mm/Hg or the heart rate is consistently > 100 beats per minute, then stop taking phentermine altogether. If the systolic BP >082 mmHg or the diastolic BP is >433 mmHg (even if it is only once), then phentermine should be stopped altogether without proving that any of these are consistently elevated. Follow up in 30 days.

## 2023-03-07 NOTE — PROGRESS NOTES
CC -   Follow up of: Wants to lose weight for health concerns. Would like to be <140 lbs    BACKGROUND -   Last visit: 2/3/22  First visit: 9/22/22    Obesity (all weight in lbs)  Began in 2018  Initial BMI 38.51, Wt 231, Ht 65\"  HS Grad wt 145   Lowest   wt 140   Highest  wt 231.4  Pattern of wt gain: gradual  Wt change past yr: +24 lbs  Most wt lost: ~20 lbs (last summer)  Other diets attempted: changed diet - more salads/fruit, walking    Desire to lose weight: 10/10    Initial Diet:    Number of meals per day - 3 (B-L-D)    Number of snacks per day - 1 (night snack)    Meal volume - 12\" plate,  never seconds    Fast food/convenience store - 0-1x/week    Restaurants (not fast food) - 0-1x/week   Sweets - 7d/week (after dinner)   Chips - 7d/week   Crackers/pretzels - 7d/week (triscuits)   Nuts - 0d/week   Peanut Butter - 0d/week   Popcorn - 0d/week   Dried fruit - 0d/week   Whole fruit - 2d/week   Breakfast cereal - 0d/week   Granola/Protein/Energy bar - 0d/week   Sugar sweetened beverages - gingerale ~1x/wk. Orange juice about 1xwk, no coffee lately, tea (calorie free), no energy drink   Protein - No supplements currently   Fiber - No supplements    Wt effect of HR foods = 2100 Bk/wk = 300 Bk/d= 14% DEN = 31 lb/year. Initial Exercise:    Gym membership - no (has treadmill)    Walking - no    Running - no    Resistance - no    Aerobic class - no     Bedtime 10-10:30->~4:50->7/8am. Daytime tiredness+.  No daytime naps.  ______________________    [facility] -  Past Medical History:   Diagnosis Date    ADHD     Anxiety     Depression     GERD (gastroesophageal reflux disease)     Granuloma annulare     Kidney stone     Migraine    Chronic Inflammatory Response Syndrome (??)  Past Surgical History:   Procedure Laterality Date    FINGER SURGERY Left 08/05/2015    Dr. Mallory Navarrete, ring finger; path report = granuloma annulare    TONSILLECTOMY       Prior to Admission medications    Medication Sig Start Date End Date Taking? Authorizing Provider   phentermine (ADIPEX-P) 37.5 MG tablet Take 1 tablet by mouth every morning (before breakfast) for 30 days. Max Daily Amount: 37.5 mg 3/7/23 4/6/23 Yes Linda Leonard MD   topiramate (TOPAMAX) 200 MG tablet TAKE 1 TABLET BY MOUTH EVERYDAY AT BEDTIME 3/14/22   Historical Provider, MD [place] 67 MCG CAPS capsule  8/25/21   Historical Provider, MD   traZODone (DESYREL) 100 MG tablet TAKE 1 TABLET BY MOUTH EVERY DAY AT BEDTIME AS NEEDED 9/27/21   Historical Provider, MD   topiramate (TOPAMAX) 100 MG tablet TAKE 1 TABLET BY MOUTH THREE TIMES A DAY 8/26/20   Historical Provider, MD   cloNIDine (CATAPRES) 0.2 MG tablet 0.2 mg 2 po @HS 8/28/20   Historical Provider, MD   ARIPiprazole (ABILIFY) 15 MG tablet Take 15 mg by mouth daily     Historical Provider, MD   tretinoin (RETIN-A) 0.025 % cream TAKE 1 APPLICATION TOPICALLY ONCE PER DAY FOR 1 MONTH APPLY TO FACE EVERY NIGHT 12/14/19   Historical Provider, MD   fluvoxaMINE (LUVOX) 100 MG tablet Take 150 mg by mouth nightly 3 po qd at hs     Historical Provider, MD   Potassium Citrate ER 15 MEQ (1620 MG) TBCR Take 15 mEq by mouth 4 tabs po qd    Historical Provider, MD     10/25/22: Taking MVI daily    Allergies: Allergies   Allergen Reactions    Latex Other (See Comments) and Rash     Other reaction(s): Makes skin bleed  On contact      Cefaclor     Penicillin G     Penicillins     Seasonal Other (See Comments)    Sulfa Antibiotics      Social history: smoking: in college (not anymore) ; Alcohol: occasionally , work: not currently (d/t CIRS). ROS -  Card - no CP  GI - no N/V/D/C    PE -  Gen : /61 (Site: Left Upper Arm, Position: Sitting, Cuff Size: Large Adult)   Pulse 75   Temp 97.6 °F (36.4 °C)   Ht 5' 5\" (1.651 m)   Wt 211 lb 12.8 oz (96.1 kg)   BMI 35.25 kg/m²    WN, WD, NAD  Lung: Nml resp effort, CTA B/L  Heart:  RRR w/o MGR, no LE pitting edema b/l  Psych: Normal mood   Full affect  Neuro:  Moves all ext well  ______________________    HISTORY & ASSESSMENT/PLAN -     Problem 1  - Fatigue/tiredness   HPI   - overall seems to be doing better than prior, sleep is good  Assessment  - gradual improvement  Plan   - Chronic inflammatory response syndrome may be contributing, . Weight reduction can help if weight is contributing to fatigue. Weight reduction per plan below    Problem 2  - Obesity   HPI   - See above Background for description    Weight  Date    231.4  9/22/22    220.0  10/25/22    213.0  12/6/22    215.0  2/2/23      Phentermine #1      211.8  3/7/23      Phentermine #2       Total weight change to date: -19.6 lbs. Average daily energy variance:  9/22/2022 - 10/25/2022: -9.8 lbs (25367 Bk)/32 d = -1072 Bk/d deficit. 10/25/2022 - 12/6/2022: -7.0 lbs (82621 Bk)/42 d = -583 Bk/d deficit. 12/6/2022 - 2/2/2023: +2.0 lbs (7000 Bk)/58 d = +121 Bk/d excess. 2/2/2023 - 3/7/2023: -3.2 lbs (98189 Bk)/33 d = -339 Bk/d deficit. Patient's estimated daily energy need (DEN) = 2101 Bk/d = 03664 Bk/wk    Update:  Calorie monitoring: , usual intake <1300 Bk/d (Sid, birthday, had a minor surgery as well. Sweets: gluten free heavenly hunk 1x/month  SSB: none (sugar free lemonade)  Restaurant food: 1x/month  Appetite suppressant: Not on Vyvanse so Phentermine was started, 37.5 mg, taking 1/2+1/12, appetite suppression: 8/10, side effects: some dry mouth and constipation  Home BP monitoring: has been WNL at home  Protein: ~50-80g/d, natural foods  Fiber: vegetables, no supplement  Water: 2L/d - increased d/t dry mouth  Activities: walking daily (smt 10k steps)    Assessment  - improving    Plan   - she is doing very well with current plan and would like to continue. Phentermine is helping and we planned to continue.  Planned as follows  Patient Instructions   Rules:  Count every calorie every day  Limit sweets to one day per month  Limit chips/crackers/pretzels/nuts/popcorn to 150 bk/day  Eliminate all sugar sweetened beverages (including fruit juice)  Limit restaurants (including fast food and food from a convenience store) to one time every two weeks while in town    Requirements:  Make sure protein intake is at least 70 grams per day   Make sure that fiber intake is at least 25 grams per day  Take one multivitamin every day    Targets:  Limit calorie intake to 1400 calories/day  Walk 30 minutes daily   Avoid eating 2 hours within bedtime. Tips:  Do not eat outside of the dining room or the kitchen  Do not eat while watching TV, videos, working on the computer or using a smart phone  Do not eat food out of a multi-serving bag or container. Phentermine:  Take phentermine 37.5 mg, one-half to one tablet daily as needed for appetite suppression. Take each dose 30-90 min before effect will be needed. While taking phentermine, check the Blood Pressure every morning and every evening. If the systolic BP is >130 mmHg, the diastolic BP is >33 mm/Hg or the heart rate is > 100 beats per minute, do not take phentermine that day. If the systolic BP is consistently >155 mmHg, the diastolic BP is consistently above 90 mm/Hg or the heart rate is consistently > 100 beats per minute, then stop taking phentermine altogether. If the systolic BP >749 mmHg or the diastolic BP is >599 mmHg (even if it is only once), then phentermine should be stopped altogether without proving that any of these are consistently elevated. Follow up in 30 days. Medication management: Phentermine    Yanelis Cantrell MD  Internal Medicine/Obesity Medicine  3/7/2023.

## 2023-03-08 ENCOUNTER — OFFICE VISIT (OUTPATIENT)
Dept: FAMILY MEDICINE CLINIC | Age: 43
End: 2023-03-08
Payer: COMMERCIAL

## 2023-03-08 VITALS
HEART RATE: 97 BPM | BODY MASS INDEX: 35.82 KG/M2 | RESPIRATION RATE: 16 BRPM | TEMPERATURE: 97.5 F | SYSTOLIC BLOOD PRESSURE: 108 MMHG | DIASTOLIC BLOOD PRESSURE: 70 MMHG | OXYGEN SATURATION: 99 % | WEIGHT: 215 LBS | HEIGHT: 65 IN

## 2023-03-08 DIAGNOSIS — R42 LIGHTHEADEDNESS: ICD-10-CM

## 2023-03-08 DIAGNOSIS — R53.83 OTHER FATIGUE: ICD-10-CM

## 2023-03-08 DIAGNOSIS — R53.83 OTHER FATIGUE: Primary | ICD-10-CM

## 2023-03-08 DIAGNOSIS — N18.31 STAGE 3A CHRONIC KIDNEY DISEASE (HCC): Primary | ICD-10-CM

## 2023-03-08 LAB
ALBUMIN SERPL-MCNC: 4.5 G/DL (ref 3.5–5.2)
ALP BLD-CCNC: 123 U/L (ref 35–104)
ALT SERPL-CCNC: 9 U/L (ref 0–32)
ANION GAP SERPL CALCULATED.3IONS-SCNC: 15 MMOL/L (ref 7–16)
AST SERPL-CCNC: 18 U/L (ref 0–31)
BASOPHILS ABSOLUTE: 0.05 E9/L (ref 0–0.2)
BASOPHILS RELATIVE PERCENT: 0.9 % (ref 0–2)
BILIRUB SERPL-MCNC: 0.4 MG/DL (ref 0–1.2)
BUN BLDV-MCNC: 18 MG/DL (ref 6–20)
CALCIUM SERPL-MCNC: 9.6 MG/DL (ref 8.6–10.2)
CHLORIDE BLD-SCNC: 112 MMOL/L (ref 98–107)
CO2: 18 MMOL/L (ref 22–29)
CREAT SERPL-MCNC: 1.3 MG/DL (ref 0.5–1)
EOSINOPHILS ABSOLUTE: 0.1 E9/L (ref 0.05–0.5)
EOSINOPHILS RELATIVE PERCENT: 1.8 % (ref 0–6)
FOLATE: 13.7 NG/ML (ref 4.8–24.2)
GFR SERPL CREATININE-BSD FRML MDRD: 53 ML/MIN/1.73
GLUCOSE BLD-MCNC: 102 MG/DL (ref 74–99)
HCT VFR BLD CALC: 48.3 % (ref 34–48)
HEMOGLOBIN: 15.7 G/DL (ref 11.5–15.5)
IMMATURE GRANULOCYTES #: 0.02 E9/L
IMMATURE GRANULOCYTES %: 0.4 % (ref 0–5)
LYMPHOCYTES ABSOLUTE: 1.45 E9/L (ref 1.5–4)
LYMPHOCYTES RELATIVE PERCENT: 26.5 % (ref 20–42)
MCH RBC QN AUTO: 31.5 PG (ref 26–35)
MCHC RBC AUTO-ENTMCNC: 32.5 % (ref 32–34.5)
MCV RBC AUTO: 97 FL (ref 80–99.9)
MONOCYTES ABSOLUTE: 0.46 E9/L (ref 0.1–0.95)
MONOCYTES RELATIVE PERCENT: 8.4 % (ref 2–12)
NEUTROPHILS ABSOLUTE: 3.4 E9/L (ref 1.8–7.3)
NEUTROPHILS RELATIVE PERCENT: 62 % (ref 43–80)
PDW BLD-RTO: 13 FL (ref 11.5–15)
PLATELET # BLD: 212 E9/L (ref 130–450)
PMV BLD AUTO: 11.7 FL (ref 7–12)
POTASSIUM SERPL-SCNC: 4.8 MMOL/L (ref 3.5–5)
RBC # BLD: 4.98 E12/L (ref 3.5–5.5)
SODIUM BLD-SCNC: 145 MMOL/L (ref 132–146)
T4 FREE: 1.36 NG/DL (ref 0.93–1.7)
TOTAL PROTEIN: 7 G/DL (ref 6.4–8.3)
TSH SERPL DL<=0.05 MIU/L-ACNC: 1.45 UIU/ML (ref 0.27–4.2)
VITAMIN B-12: 430 PG/ML (ref 211–946)
VITAMIN D 25-HYDROXY: 73 NG/ML (ref 30–100)
WBC # BLD: 5.5 E9/L (ref 4.5–11.5)

## 2023-03-08 PROCEDURE — G8427 DOCREV CUR MEDS BY ELIG CLIN: HCPCS | Performed by: FAMILY MEDICINE

## 2023-03-08 PROCEDURE — 99214 OFFICE O/P EST MOD 30 MIN: CPT | Performed by: FAMILY MEDICINE

## 2023-03-08 PROCEDURE — G8484 FLU IMMUNIZE NO ADMIN: HCPCS | Performed by: FAMILY MEDICINE

## 2023-03-08 PROCEDURE — 1036F TOBACCO NON-USER: CPT | Performed by: FAMILY MEDICINE

## 2023-03-08 PROCEDURE — G8417 CALC BMI ABV UP PARAM F/U: HCPCS | Performed by: FAMILY MEDICINE

## 2023-03-08 SDOH — ECONOMIC STABILITY: HOUSING INSECURITY
IN THE LAST 12 MONTHS, WAS THERE A TIME WHEN YOU DID NOT HAVE A STEADY PLACE TO SLEEP OR SLEPT IN A SHELTER (INCLUDING NOW)?: NO

## 2023-03-08 SDOH — ECONOMIC STABILITY: FOOD INSECURITY: WITHIN THE PAST 12 MONTHS, YOU WORRIED THAT YOUR FOOD WOULD RUN OUT BEFORE YOU GOT MONEY TO BUY MORE.: NEVER TRUE

## 2023-03-08 SDOH — ECONOMIC STABILITY: INCOME INSECURITY: HOW HARD IS IT FOR YOU TO PAY FOR THE VERY BASICS LIKE FOOD, HOUSING, MEDICAL CARE, AND HEATING?: NOT HARD AT ALL

## 2023-03-08 SDOH — ECONOMIC STABILITY: FOOD INSECURITY: WITHIN THE PAST 12 MONTHS, THE FOOD YOU BOUGHT JUST DIDN'T LAST AND YOU DIDN'T HAVE MONEY TO GET MORE.: NEVER TRUE

## 2023-03-08 ASSESSMENT — ENCOUNTER SYMPTOMS
DIARRHEA: 0
PHOTOPHOBIA: 0
ALLERGIC/IMMUNOLOGIC NEGATIVE: 1
NAUSEA: 0
BACK PAIN: 0
VOMITING: 0
SINUS PAIN: 0
TROUBLE SWALLOWING: 0
COUGH: 0
BLOOD IN STOOL: 0
EYE DISCHARGE: 0
SHORTNESS OF BREATH: 0
SORE THROAT: 0
ABDOMINAL PAIN: 0
EYE PAIN: 0
EYE REDNESS: 0
CHEST TIGHTNESS: 0

## 2023-03-08 ASSESSMENT — PATIENT HEALTH QUESTIONNAIRE - PHQ9
9. THOUGHTS THAT YOU WOULD BE BETTER OFF DEAD, OR OF HURTING YOURSELF: 0
4. FEELING TIRED OR HAVING LITTLE ENERGY: 3
8. MOVING OR SPEAKING SO SLOWLY THAT OTHER PEOPLE COULD HAVE NOTICED. OR THE OPPOSITE, BEING SO FIGETY OR RESTLESS THAT YOU HAVE BEEN MOVING AROUND A LOT MORE THAN USUAL: 1
SUM OF ALL RESPONSES TO PHQ QUESTIONS 1-9: 8
7. TROUBLE CONCENTRATING ON THINGS, SUCH AS READING THE NEWSPAPER OR WATCHING TELEVISION: 1
5. POOR APPETITE OR OVEREATING: 1
SUM OF ALL RESPONSES TO PHQ QUESTIONS 1-9: 8
10. IF YOU CHECKED OFF ANY PROBLEMS, HOW DIFFICULT HAVE THESE PROBLEMS MADE IT FOR YOU TO DO YOUR WORK, TAKE CARE OF THINGS AT HOME, OR GET ALONG WITH OTHER PEOPLE: 1
6. FEELING BAD ABOUT YOURSELF - OR THAT YOU ARE A FAILURE OR HAVE LET YOURSELF OR YOUR FAMILY DOWN: 1
SUM OF ALL RESPONSES TO PHQ QUESTIONS 1-9: 8
2. FEELING DOWN, DEPRESSED OR HOPELESS: 0
3. TROUBLE FALLING OR STAYING ASLEEP: 1
SUM OF ALL RESPONSES TO PHQ QUESTIONS 1-9: 8

## 2023-03-08 NOTE — PROGRESS NOTES
3/8/23  Romeo BellRUST : 1980 Sex: female  Age: 43 y.o. Assessment and Plan:  Maritza Harden was seen today for fatigue. Diagnoses and all orders for this visit:    Other fatigue  -     CBC with Auto Differential; Future  -     Comprehensive Metabolic Panel; Future  -     Vitamin D 25 Hydroxy; Future  -     Vitamin B12 & Folate; Future  -     T4, Free; Future  -     TSH; Future    Lightheadedness  -     CBC with Auto Differential; Future  -     Comprehensive Metabolic Panel; Future  -     Vitamin D 25 Hydroxy; Future  -     Vitamin B12 & Folate; Future  -     T4, Free; Future  -     TSH; Future    Will rule out metabolic causes for her complaints. This also may be related to her blood sugar, endocrinology appointment possibility after lab work is reviewed. Return in about 2 weeks (around 3/22/2023). Chief Complaint   Patient presents with    Fatigue       Patient complains of fatigue, lightheadedness. Has been ongoing for the last couple of months. She denies chest pain, palpitations, shortness of breath, diaphoresis. She has lightheadedness especially in the morning before breakfast.  States that this occurs also after eating. Review of Systems   Constitutional:  Positive for fatigue. Negative for appetite change and unexpected weight change. HENT:  Negative for congestion, ear pain, hearing loss, sinus pain, sore throat and trouble swallowing. Eyes:  Negative for photophobia, pain, discharge and redness. Respiratory:  Negative for cough, chest tightness and shortness of breath. Cardiovascular:  Negative for chest pain, palpitations and leg swelling. Gastrointestinal:  Negative for abdominal pain, blood in stool, diarrhea, nausea and vomiting. Endocrine: Negative. Genitourinary:  Negative for dysuria, flank pain, frequency and hematuria. Musculoskeletal:  Negative for arthralgias, back pain, joint swelling and myalgias. Skin: Negative.     Allergic/Immunologic: Negative. Neurological:  Positive for light-headedness. Negative for dizziness, seizures, syncope, weakness, numbness and headaches. Hematological:  Negative for adenopathy. Does not bruise/bleed easily. Psychiatric/Behavioral: Negative. Current Outpatient Medications:     phentermine (ADIPEX-P) 37.5 MG tablet, Take 1 tablet by mouth every morning (before breakfast) for 30 days.  Max Daily Amount: 37.5 mg, Disp: 30 tablet, Rfl: 0    topiramate (TOPAMAX) 200 MG tablet, TAKE 1 TABLET BY MOUTH EVERYDAY AT BEDTIME, Disp: , Rfl:     LINZESS 72 MCG CAPS capsule, , Disp: , Rfl:     traZODone (DESYREL) 100 MG tablet, TAKE 1 TABLET BY MOUTH EVERY DAY AT BEDTIME AS NEEDED, Disp: , Rfl:     topiramate (TOPAMAX) 100 MG tablet, TAKE 1 TABLET BY MOUTH THREE TIMES A DAY, Disp: , Rfl:     cloNIDine (CATAPRES) 0.2 MG tablet, 0.2 mg 2 po @HS, Disp: , Rfl:     ARIPiprazole (ABILIFY) 15 MG tablet, Take 15 mg by mouth daily , Disp: , Rfl:     tretinoin (RETIN-A) 0.025 % cream, TAKE 1 APPLICATION TOPICALLY ONCE PER DAY FOR 1 MONTH APPLY TO FACE EVERY NIGHT, Disp: , Rfl:     fluvoxaMINE (LUVOX) 100 MG tablet, Take 150 mg by mouth nightly 3 po qd at hs , Disp: , Rfl:     Potassium Citrate ER 15 MEQ (1620 MG) TBCR, Take 15 mEq by mouth 4 tabs po qd, Disp: , Rfl:   Allergies   Allergen Reactions    Latex Other (See Comments) and Rash     Other reaction(s): Makes skin bleed  On contact      Cefaclor     Penicillin G     Penicillins     Seasonal Other (See Comments)    Sulfa Antibiotics        Past Medical History:   Diagnosis Date    ADHD     Anxiety     Depression     GERD (gastroesophageal reflux disease)     Granuloma annulare     Kidney stone     Migraine      Past Surgical History:   Procedure Laterality Date    FINGER SURGERY Left 08/05/2015    Dr. Otf Jacob, ring finger; path report = granuloma annulare    TONSILLECTOMY       Family History   Problem Relation Age of Onset    Uterine Cancer Mother     Thyroid Disease Mother     Osteoporosis Mother     No Known Problems Father      Social History     Socioeconomic History    Marital status:      Spouse name: Not on file    Number of children: Not on file    Years of education: Not on file    Highest education level: Not on file   Occupational History    Occupation: previous phlebotomist, teacher   Tobacco Use    Smoking status: Never    Smokeless tobacco: Never   Vaping Use    Vaping Use: Never used   Substance and Sexual Activity    Alcohol use: Yes     Comment: rarely    Drug use: No     Comment: remote marijuana use    Sexual activity: Not on file   Other Topics Concern    Not on file   Social History Narrative    Not on file     Social Determinants of Health     Financial Resource Strain: Low Risk     Difficulty of Paying Living Expenses: Not hard at all   Food Insecurity: No Food Insecurity    Worried About Running Out of Food in the Last Year: Never true    [address] in the Last Year: Never true   Transportation Needs: Unknown    Lack of Transportation (Medical): Not on file    Lack of Transportation (Non-Medical):  No   Physical Activity: Not on file   Stress: Not on file   Social Connections: Not on file   Intimate Partner Violence: Not on file   Housing Stability: Unknown    Unable to Pay for Housing in the Last Year: Not on file    Number of Places Lived in the Last Year: Not on file    Unstable Housing in the Last Year: No       Vitals:    03/08/23 1339   BP: 108/70   Pulse: 97   Resp: 16   Temp: 97.5 °F (36.4 °C)   TempSrc: Temporal   SpO2: 99%   Weight: 215 lb (97.5 kg)   Height: 5' 5\" (1.651 m)       Physical Exam        Seen By:  Maria Victoria Balderas DO

## 2023-03-20 ENCOUNTER — TELEPHONE (OUTPATIENT)
Dept: FAMILY MEDICINE CLINIC | Age: 43
End: 2023-03-20

## 2023-03-20 DIAGNOSIS — N17.9 ACUTE RENAL FAILURE SUPERIMPOSED ON STAGE 3A CHRONIC KIDNEY DISEASE, UNSPECIFIED ACUTE RENAL FAILURE TYPE (HCC): Primary | ICD-10-CM

## 2023-03-20 DIAGNOSIS — N18.31 STAGE 3A CHRONIC KIDNEY DISEASE (HCC): ICD-10-CM

## 2023-03-20 DIAGNOSIS — N18.31 ACUTE RENAL FAILURE SUPERIMPOSED ON STAGE 3A CHRONIC KIDNEY DISEASE, UNSPECIFIED ACUTE RENAL FAILURE TYPE (HCC): Primary | ICD-10-CM

## 2023-03-20 NOTE — TELEPHONE ENCOUNTER
Sade Helm calling back to tell you what kidney doctor she would like to see. She is asking for a referral to see Dr Austin Sue in [facility] with the [facility].

## 2023-04-03 ENCOUNTER — OFFICE VISIT (OUTPATIENT)
Dept: BARIATRICS/WEIGHT MGMT | Age: 43
End: 2023-04-03
Payer: COMMERCIAL

## 2023-04-03 VITALS
WEIGHT: 214.4 LBS | SYSTOLIC BLOOD PRESSURE: 92 MMHG | HEIGHT: 65 IN | TEMPERATURE: 97.9 F | DIASTOLIC BLOOD PRESSURE: 60 MMHG | HEART RATE: 79 BPM | BODY MASS INDEX: 35.72 KG/M2

## 2023-04-03 DIAGNOSIS — E66.09 CLASS 2 OBESITY DUE TO EXCESS CALORIES WITHOUT SERIOUS COMORBIDITY WITH BODY MASS INDEX (BMI) OF 35.0 TO 35.9 IN ADULT: ICD-10-CM

## 2023-04-03 DIAGNOSIS — R53.82 CHRONIC FATIGUE: Primary | ICD-10-CM

## 2023-04-03 PROCEDURE — G8428 CUR MEDS NOT DOCUMENT: HCPCS | Performed by: INTERNAL MEDICINE

## 2023-04-03 PROCEDURE — 99211 OFF/OP EST MAY X REQ PHY/QHP: CPT

## 2023-04-03 PROCEDURE — G8417 CALC BMI ABV UP PARAM F/U: HCPCS | Performed by: INTERNAL MEDICINE

## 2023-04-03 PROCEDURE — 1036F TOBACCO NON-USER: CPT | Performed by: INTERNAL MEDICINE

## 2023-04-03 PROCEDURE — 99214 OFFICE O/P EST MOD 30 MIN: CPT | Performed by: INTERNAL MEDICINE

## 2023-04-03 RX ORDER — PHENTERMINE HYDROCHLORIDE 37.5 MG/1
37.5 TABLET ORAL
Qty: 30 TABLET | Refills: 0 | Status: SHIPPED | OUTPATIENT
Start: 2023-04-03 | End: 2023-05-03

## 2023-04-03 NOTE — PATIENT INSTRUCTIONS
Rules:  Count every calorie every day  Limit sweets to one day per month  Limit chips/crackers/pretzels/nuts/popcorn to 150 jose francisco/day  Eliminate all sugar sweetened beverages (including fruit juice)  Limit restaurants (including fast food and food from a convenience store) to one time every two weeks while in town    Requirements:  Make sure protein intake is at least 70 grams per day   Make sure that fiber intake is at least 25 grams per day  Take one multivitamin every day    Targets:  Limit calorie intake to 1400 calories/day  Walk 30 minutes daily   Avoid eating 2 hours within bedtime. Tips:  Do not eat outside of the dining room or the kitchen  Do not eat while watching TV, videos, working on the computer or using a smart phone  Do not eat food out of a multi-serving bag or container. Phentermine:  Take phentermine 37.5 mg, one-half to one tablet daily as needed for appetite suppression. Take each dose 30-90 min before effect will be needed. While taking phentermine, check the Blood Pressure every morning and every evening. If the systolic BP is >953 mmHg, the diastolic BP is >75 mm/Hg or the heart rate is > 100 beats per minute, do not take phentermine that day. If the systolic BP is consistently >155 mmHg, the diastolic BP is consistently above 90 mm/Hg or the heart rate is consistently > 100 beats per minute, then stop taking phentermine altogether. If the systolic BP >777 mmHg or the diastolic BP is >195 mmHg (even if it is only once), then phentermine should be stopped altogether without proving that any of these are consistently elevated. Follow up in 30 days.

## 2023-04-14 ENCOUNTER — TELEPHONE (OUTPATIENT)
Dept: BARIATRICS/WEIGHT MGMT | Age: 43
End: 2023-04-14

## 2023-04-21 ENCOUNTER — HOSPITAL ENCOUNTER (OUTPATIENT)
Dept: CT IMAGING | Age: 43
Discharge: HOME | End: 2023-04-21
Payer: COMMERCIAL

## 2023-04-21 DIAGNOSIS — F41.1 ANXIETY STATE, NEUROTIC: ICD-10-CM

## 2023-04-21 DIAGNOSIS — F41.1 GENERALIZED ANXIETY DISORDER: ICD-10-CM

## 2023-04-21 DIAGNOSIS — E66.9 OBESITY, UNSPECIFIED CLASSIFICATION, UNSPECIFIED OBESITY TYPE, UNSPECIFIED WHETHER SERIOUS COMORBIDITY PRESENT: ICD-10-CM

## 2023-04-21 DIAGNOSIS — G43.009 ATYPICAL MIGRAINE: ICD-10-CM

## 2023-04-21 DIAGNOSIS — F90.0 ATTENTION DEFICIT HYPERACTIVITY DISORDER, INATTENTIVE TYPE: ICD-10-CM

## 2023-04-21 DIAGNOSIS — K58.9 COLONOSPASM: ICD-10-CM

## 2023-04-21 DIAGNOSIS — Z87.442 PERSONAL HISTORY OF URINARY CALCULI: ICD-10-CM

## 2023-04-21 PROCEDURE — 74150 CT ABDOMEN W/O CONTRAST: CPT

## 2023-05-24 ENCOUNTER — OFFICE VISIT (OUTPATIENT)
Dept: BARIATRICS/WEIGHT MGMT | Age: 43
End: 2023-05-24
Payer: COMMERCIAL

## 2023-05-24 VITALS
BODY MASS INDEX: 36.29 KG/M2 | SYSTOLIC BLOOD PRESSURE: 105 MMHG | HEIGHT: 65 IN | DIASTOLIC BLOOD PRESSURE: 66 MMHG | HEART RATE: 79 BPM | WEIGHT: 217.8 LBS | TEMPERATURE: 98.1 F

## 2023-05-24 DIAGNOSIS — R53.82 CHRONIC FATIGUE: Primary | ICD-10-CM

## 2023-05-24 DIAGNOSIS — E66.09 CLASS 2 OBESITY DUE TO EXCESS CALORIES WITHOUT SERIOUS COMORBIDITY WITH BODY MASS INDEX (BMI) OF 36.0 TO 36.9 IN ADULT: ICD-10-CM

## 2023-05-24 PROCEDURE — 99214 OFFICE O/P EST MOD 30 MIN: CPT | Performed by: INTERNAL MEDICINE

## 2023-05-24 PROCEDURE — 1036F TOBACCO NON-USER: CPT | Performed by: INTERNAL MEDICINE

## 2023-05-24 PROCEDURE — G8417 CALC BMI ABV UP PARAM F/U: HCPCS | Performed by: INTERNAL MEDICINE

## 2023-05-24 PROCEDURE — G8428 CUR MEDS NOT DOCUMENT: HCPCS | Performed by: INTERNAL MEDICINE

## 2023-05-24 PROCEDURE — 99211 OFF/OP EST MAY X REQ PHY/QHP: CPT

## 2023-05-24 RX ORDER — SEMAGLUTIDE 0.25 MG/.5ML
0.25 INJECTION, SOLUTION SUBCUTANEOUS
Qty: 2 ML | Refills: 1 | Status: SHIPPED | OUTPATIENT
Start: 2023-05-24

## 2023-05-24 RX ORDER — LISDEXAMFETAMINE DIMESYLATE 60 MG/1
1 CAPSULE ORAL EVERY MORNING
COMMUNITY
Start: 2023-04-26

## 2023-05-24 NOTE — PATIENT INSTRUCTIONS
Rules:  Count every calorie every day  Limit sweets to one day per month  Limit chips/crackers/pretzels/nuts/popcorn to 150 jose francisco/day  Eliminate all sugar sweetened beverages (including fruit juice)  Limit restaurants (including fast food and food from a convenience store) to one time every two weeks while in town    Requirements:  Make sure protein intake is at least 70 grams per day   Make sure that fiber intake is at least 25 grams per day  Take one multivitamin every day    Targets:  Limit calorie intake to 1400 calories/day  Walk 30 minutes daily   Avoid eating 2 hours within bedtime. Tips:  Do not eat outside of the dining room or the kitchen  Do not eat while watching TV, videos, working on the computer or using a smart phone  Do not eat food out of a multi-serving bag or container. Wegovy:  Start Wegovy by taking 0.25 mg once each week for four weeks, then increase to 0.5 mg once each week. Follow up in 6-8 weeks.

## 2023-05-24 NOTE — PROGRESS NOTES
CC -   Follow up of: Wants to lose weight for health concerns. Would like to be <140 lbs    BACKGROUND -   Last visit: 4/3/23  First visit: 9/22/22    Obesity (all weight in lbs)  Began in 2018  Initial BMI 38.51, Wt 231, Ht 65\"  HS Grad wt 145   Lowest   wt 140   Highest  wt 231.4  Pattern of wt gain: gradual  Wt change past yr: +24 lbs  Most wt lost: ~20 lbs (last summer)  Other diets attempted: changed diet - more salads/fruit, walking    Desire to lose weight: 10/10    Initial Diet:    Number of meals per day - 3 (B-L-D)    Number of snacks per day - 1 (night snack)    Meal volume - 12\" plate,  never seconds    Fast food/convenience store - 0-1x/week    Restaurants (not fast food) - 0-1x/week   Sweets - 7d/week (after dinner)   Chips - 7d/week   Crackers/pretzels - 7d/week (triscuits)   Nuts - 0d/week   Peanut Butter - 0d/week   Popcorn - 0d/week   Dried fruit - 0d/week   Whole fruit - 2d/week   Breakfast cereal - 0d/week   Granola/Protein/Energy bar - 0d/week   Sugar sweetened beverages - gingerale ~1x/wk. Orange juice about 1xwk, no coffee lately, tea (calorie free), no energy drink   Protein - No supplements currently   Fiber - No supplements    Wt effect of HR foods = 2100 Bk/wk = 300 Bk/d= 14% DEN = 31 lb/year. Initial Exercise:    Gym membership - no (has treadmill)    Walking - no    Running - no    Resistance - no    Aerobic class - no     Bedtime 10-10:30->~4:50->7/8am. Daytime tiredness+.  No daytime naps.  ______________________    [facility] -  Past Medical History:   Diagnosis Date    ADHD     Anxiety     Depression     GERD (gastroesophageal reflux disease)     Granuloma annulare     Kidney stone     Migraine    Chronic Inflammatory Response Syndrome (??)  Past Surgical History:   Procedure Laterality Date    FINGER SURGERY Left 08/05/2015    Dr. Lisa Vazquez, ring finger; path report = granuloma annulare    TONSILLECTOMY       Prior to Admission medications    Medication Sig Start Date End Date

## 2023-06-23 ENCOUNTER — TELEPHONE (OUTPATIENT)
Dept: BARIATRICS/WEIGHT MGMT | Age: 43
End: 2023-06-23

## 2023-06-25 DIAGNOSIS — E66.09 CLASS 2 OBESITY DUE TO EXCESS CALORIES WITHOUT SERIOUS COMORBIDITY WITH BODY MASS INDEX (BMI) OF 36.0 TO 36.9 IN ADULT: Primary | ICD-10-CM

## 2023-07-11 ENCOUNTER — OFFICE VISIT (OUTPATIENT)
Dept: BARIATRICS/WEIGHT MGMT | Age: 43
End: 2023-07-11
Payer: COMMERCIAL

## 2023-07-11 VITALS
SYSTOLIC BLOOD PRESSURE: 93 MMHG | HEIGHT: 65 IN | BODY MASS INDEX: 35.82 KG/M2 | TEMPERATURE: 97.2 F | WEIGHT: 215 LBS | HEART RATE: 76 BPM | DIASTOLIC BLOOD PRESSURE: 59 MMHG

## 2023-07-11 DIAGNOSIS — E66.09 CLASS 2 OBESITY DUE TO EXCESS CALORIES WITHOUT SERIOUS COMORBIDITY WITH BODY MASS INDEX (BMI) OF 36.0 TO 36.9 IN ADULT: ICD-10-CM

## 2023-07-11 DIAGNOSIS — R53.82 CHRONIC FATIGUE: Primary | ICD-10-CM

## 2023-07-11 PROCEDURE — 99214 OFFICE O/P EST MOD 30 MIN: CPT | Performed by: INTERNAL MEDICINE

## 2023-07-11 PROCEDURE — 1036F TOBACCO NON-USER: CPT | Performed by: INTERNAL MEDICINE

## 2023-07-11 PROCEDURE — G8417 CALC BMI ABV UP PARAM F/U: HCPCS | Performed by: INTERNAL MEDICINE

## 2023-07-11 PROCEDURE — 99211 OFF/OP EST MAY X REQ PHY/QHP: CPT

## 2023-07-11 PROCEDURE — G8428 CUR MEDS NOT DOCUMENT: HCPCS | Performed by: INTERNAL MEDICINE

## 2023-07-11 NOTE — PROGRESS NOTES
food: just on vacation  Appetite suppressant: Is on Vyvanse 60 mg. Saxenda ->had nausea initially but better now (some tiredness). Appetite suppression ~10/10. Home BP monitoring: has been WNL at home  Protein: ~50-80g/d, chicken, fish etc.  Fiber: vegetables, no supplement  Water: 2L/d - increased d/t dry mouth  Activities: walking daily (smt 10k steps) - a lot outside  Fish oil, biotin, vitamin D. Assessment  - uncontrolled    Plan   - she is doing fairly well with current plan and would like to continue, though weight loss is suboptimal. We talked about high risk food, which she will work on avoiding (had vacation). [employer] was not available and we planned to start [address] [facility] - which is helping and we planned to continue. Planned as follows  Patient Instructions   Rules:  Count every calorie every day  Limit sweets to one day per month  Limit chips/crackers/pretzels/nuts/popcorn to 150 jose francisco/day  Eliminate all sugar sweetened beverages (including fruit juice)  Limit restaurants (including fast food and food from a convenience store) to one time every two weeks while in town    Requirements:  Make sure protein intake is at least 70 grams per day   Make sure that fiber intake is at least 25 grams per day  Take one multivitamin every day    Targets:  Limit calorie intake to 1400 calories/day  Walk 30 minutes daily   Avoid eating 2 hours within bedtime. Tips:  Do not eat outside of the dining room or the kitchen  Do not eat while watching TV, videos, working on the computer or using a smart phone  Do not eat food out of a multi-serving bag or container. Continue Saxenda 3 mg daily. Follow up in 2-3 months. Medication management: Sergo Sanchez MD  Internal Medicine/Obesity Medicine  7/11/2023.

## 2023-07-11 NOTE — PATIENT INSTRUCTIONS
Rules:  Count every calorie every day  Limit sweets to one day per month  Limit chips/crackers/pretzels/nuts/popcorn to 150 jose francisco/day  Eliminate all sugar sweetened beverages (including fruit juice)  Limit restaurants (including fast food and food from a convenience store) to one time every two weeks while in town    Requirements:  Make sure protein intake is at least 70 grams per day   Make sure that fiber intake is at least 25 grams per day  Take one multivitamin every day    Targets:  Limit calorie intake to 1400 calories/day  Walk 30 minutes daily   Avoid eating 2 hours within bedtime. Tips:  Do not eat outside of the dining room or the kitchen  Do not eat while watching TV, videos, working on the computer or using a smart phone  Do not eat food out of a multi-serving bag or container. Continue Saxenda 3 mg daily. Follow up in 2-3 months.

## 2023-07-24 ENCOUNTER — OFFICE VISIT (OUTPATIENT)
Dept: FAMILY MEDICINE CLINIC | Age: 43
End: 2023-07-24
Payer: COMMERCIAL

## 2023-07-24 VITALS
WEIGHT: 211.6 LBS | HEART RATE: 92 BPM | OXYGEN SATURATION: 98 % | TEMPERATURE: 98.1 F | SYSTOLIC BLOOD PRESSURE: 94 MMHG | BODY MASS INDEX: 35.25 KG/M2 | DIASTOLIC BLOOD PRESSURE: 62 MMHG | HEIGHT: 65 IN

## 2023-07-24 DIAGNOSIS — K58.2 IRRITABLE BOWEL SYNDROME WITH BOTH CONSTIPATION AND DIARRHEA: ICD-10-CM

## 2023-07-24 DIAGNOSIS — N18.31 STAGE 3A CHRONIC KIDNEY DISEASE (HCC): ICD-10-CM

## 2023-07-24 DIAGNOSIS — E66.09 CLASS 2 OBESITY DUE TO EXCESS CALORIES WITHOUT SERIOUS COMORBIDITY WITH BODY MASS INDEX (BMI) OF 36.0 TO 36.9 IN ADULT: Primary | ICD-10-CM

## 2023-07-24 PROBLEM — N18.9 CKD (CHRONIC KIDNEY DISEASE): Status: ACTIVE | Noted: 2023-07-24

## 2023-07-24 PROCEDURE — G8427 DOCREV CUR MEDS BY ELIG CLIN: HCPCS | Performed by: FAMILY MEDICINE

## 2023-07-24 PROCEDURE — 1036F TOBACCO NON-USER: CPT | Performed by: FAMILY MEDICINE

## 2023-07-24 PROCEDURE — 99213 OFFICE O/P EST LOW 20 MIN: CPT | Performed by: FAMILY MEDICINE

## 2023-07-24 PROCEDURE — G8417 CALC BMI ABV UP PARAM F/U: HCPCS | Performed by: FAMILY MEDICINE

## 2023-07-24 ASSESSMENT — ENCOUNTER SYMPTOMS
TROUBLE SWALLOWING: 0
SINUS PAIN: 0
EYE PAIN: 0
ALLERGIC/IMMUNOLOGIC NEGATIVE: 1
PHOTOPHOBIA: 0
EYE REDNESS: 0
COUGH: 0
DIARRHEA: 0
SHORTNESS OF BREATH: 0
BLOOD IN STOOL: 0
VOMITING: 0
BACK PAIN: 0
NAUSEA: 0
CHEST TIGHTNESS: 0
ABDOMINAL PAIN: 0
EYE DISCHARGE: 0
SORE THROAT: 0

## 2023-07-25 LAB
ANION GAP SERPL CALCULATED.3IONS-SCNC: 12 MMOL/L (ref 7–16)
BUN BLDV-MCNC: 11 MG/DL (ref 6–20)
CALCIUM SERPL-MCNC: 9.5 MG/DL (ref 8.6–10.2)
CHLORIDE BLD-SCNC: 109 MMOL/L (ref 98–107)
CO2: 21 MMOL/L (ref 22–29)
CREAT SERPL-MCNC: 1.1 MG/DL (ref 0.5–1)
GFR SERPL CREATININE-BSD FRML MDRD: >60 ML/MIN/1.73M2
GLUCOSE BLD-MCNC: 115 MG/DL (ref 74–99)
POTASSIUM SERPL-SCNC: 4 MMOL/L (ref 3.5–5)
SODIUM BLD-SCNC: 142 MMOL/L (ref 132–146)

## 2023-08-24 ENCOUNTER — TELEPHONE (OUTPATIENT)
Dept: BARIATRICS/WEIGHT MGMT | Age: 43
End: 2023-08-24

## 2023-08-25 LAB
BACTERIA URNS QL MICRO: ABNORMAL
BILIRUB UR QL STRIP: NEGATIVE
CLARITY UR: CLEAR
COLOR UR: YELLOW
CRYSTALS URNS MICRO: ABNORMAL /HPF
EPI CELLS #/AREA URNS HPF: ABNORMAL /HPF
GLUCOSE UR STRIP-MCNC: NEGATIVE MG/DL
HGB UR QL STRIP.AUTO: ABNORMAL
KETONES UR STRIP-MCNC: NEGATIVE MG/DL
LEUKOCYTE ESTERASE UR QL STRIP: NEGATIVE
NITRITE UR QL STRIP: NEGATIVE
PH UR STRIP: 6.5 [PH] (ref 5–9)
PROT UR STRIP-MCNC: NEGATIVE MG/DL
RBC #/AREA URNS HPF: ABNORMAL /HPF
SP GR UR STRIP: 1.01 (ref 1–1.03)
UROBILINOGEN UR STRIP-ACNC: 1 EU/DL (ref 0–1)
WBC #/AREA URNS HPF: ABNORMAL /HPF

## 2023-09-01 ENCOUNTER — OFFICE VISIT (OUTPATIENT)
Dept: FAMILY MEDICINE CLINIC | Age: 43
End: 2023-09-01
Payer: COMMERCIAL

## 2023-09-01 VITALS
OXYGEN SATURATION: 98 % | DIASTOLIC BLOOD PRESSURE: 78 MMHG | TEMPERATURE: 97.4 F | BODY MASS INDEX: 33.82 KG/M2 | WEIGHT: 203 LBS | SYSTOLIC BLOOD PRESSURE: 98 MMHG | RESPIRATION RATE: 17 BRPM | HEIGHT: 65 IN | HEART RATE: 91 BPM

## 2023-09-01 DIAGNOSIS — Z00.01 ENCOUNTER FOR GENERAL ADULT MEDICAL EXAMINATION WITH ABNORMAL FINDINGS: Primary | ICD-10-CM

## 2023-09-01 PROCEDURE — 99396 PREV VISIT EST AGE 40-64: CPT | Performed by: FAMILY MEDICINE

## 2023-09-01 ASSESSMENT — ENCOUNTER SYMPTOMS
COUGH: 0
EYE DISCHARGE: 0
BACK PAIN: 0
NAUSEA: 0
SORE THROAT: 0
SINUS PAIN: 0
VOMITING: 0
BLOOD IN STOOL: 0
TROUBLE SWALLOWING: 0
DIARRHEA: 0
PHOTOPHOBIA: 0
EYE REDNESS: 0
CHEST TIGHTNESS: 0
ABDOMINAL PAIN: 0
EYE PAIN: 0
ALLERGIC/IMMUNOLOGIC NEGATIVE: 1
SHORTNESS OF BREATH: 0

## 2023-09-01 NOTE — PROGRESS NOTES
tablet, TAKE 1 TABLET BY MOUTH EVERYDAY AT BEDTIME, Disp: , Rfl:     LINZESS 72 MCG CAPS capsule, , Disp: , Rfl:     traZODone (DESYREL) 100 MG tablet, TAKE 1 TABLET BY MOUTH EVERY DAY AT BEDTIME AS NEEDED, Disp: , Rfl:     topiramate (TOPAMAX) 100 MG tablet, Take 1 tablet by mouth daily, Disp: , Rfl:     cloNIDine (CATAPRES) 0.2 MG tablet, 1 tablet 2 po @HS, Disp: , Rfl:     ARIPiprazole (ABILIFY) 15 MG tablet, Take 1 tablet by mouth daily, Disp: , Rfl:     tretinoin (RETIN-A) 0.025 % cream, TAKE 1 APPLICATION TOPICALLY ONCE PER DAY FOR 1 MONTH APPLY TO FACE EVERY NIGHT, Disp: , Rfl:     fluvoxaMINE (LUVOX) 100 MG tablet, Take 1.5 tablets by mouth nightly 3 po qd at hs, Disp: , Rfl:     Potassium Citrate ER 15 MEQ (1620 MG) TBCR, Take 1 tablet by mouth 4 tabs po qd, Disp: , Rfl:     VYVANSE 60 MG CAPS, Take 1 capsule by mouth every morning.  Max Daily Amount: 1 capsule, Disp: , Rfl:   Allergies   Allergen Reactions    Latex Other (See Comments) and Rash     Other reaction(s): Makes skin bleed  On contact      Cefaclor     Penicillin G     Penicillins     Seasonal Other (See Comments)    Sulfa Antibiotics        Past Medical History:   Diagnosis Date    ADHD     Anxiety     Depression     GERD (gastroesophageal reflux disease)     Granuloma annulare     Kidney stone     Migraine      Past Surgical History:   Procedure Laterality Date    FINGER SURGERY Left 08/05/2015    Dr. Bre Schneider, ring finger; path report = granuloma annulare    TONSILLECTOMY       Family History   Problem Relation Age of Onset    Uterine Cancer Mother     Thyroid Disease Mother     Osteoporosis Mother     No Known Problems Father      Social History     Socioeconomic History    Marital status:      Spouse name: Not on file    Number of children: Not on file    Years of education: Not on file    Highest education level: Not on file   Occupational History    Occupation: previous phlebotomist, teacher   Tobacco Use    Smoking status: Never

## 2023-09-28 ENCOUNTER — OFFICE VISIT (OUTPATIENT)
Dept: BARIATRICS/WEIGHT MGMT | Age: 43
End: 2023-09-28
Payer: COMMERCIAL

## 2023-09-28 VITALS
SYSTOLIC BLOOD PRESSURE: 89 MMHG | HEART RATE: 86 BPM | BODY MASS INDEX: 32.92 KG/M2 | DIASTOLIC BLOOD PRESSURE: 58 MMHG | TEMPERATURE: 97.3 F | WEIGHT: 197.6 LBS | HEIGHT: 65 IN

## 2023-09-28 DIAGNOSIS — E66.09 CLASS 1 OBESITY DUE TO EXCESS CALORIES WITH SERIOUS COMORBIDITY AND BODY MASS INDEX (BMI) OF 32.0 TO 32.9 IN ADULT: ICD-10-CM

## 2023-09-28 DIAGNOSIS — R53.82 CHRONIC FATIGUE: Primary | ICD-10-CM

## 2023-09-28 PROCEDURE — 99211 OFF/OP EST MAY X REQ PHY/QHP: CPT

## 2023-09-28 PROCEDURE — 1036F TOBACCO NON-USER: CPT | Performed by: INTERNAL MEDICINE

## 2023-09-28 PROCEDURE — 99214 OFFICE O/P EST MOD 30 MIN: CPT | Performed by: INTERNAL MEDICINE

## 2023-09-28 PROCEDURE — G8428 CUR MEDS NOT DOCUMENT: HCPCS | Performed by: INTERNAL MEDICINE

## 2023-09-28 PROCEDURE — G8417 CALC BMI ABV UP PARAM F/U: HCPCS | Performed by: INTERNAL MEDICINE

## 2023-09-28 RX ORDER — DEXTROAMPHETAMINE SACCHARATE, AMPHETAMINE ASPARTATE MONOHYDRATE, DEXTROAMPHETAMINE SULFATE AND AMPHETAMINE SULFATE 6.25; 6.25; 6.25; 6.25 MG/1; MG/1; MG/1; MG/1
CAPSULE, EXTENDED RELEASE ORAL
COMMUNITY
Start: 2023-09-11

## 2023-09-28 RX ORDER — SEMAGLUTIDE 1.7 MG/.75ML
1.7 INJECTION, SOLUTION SUBCUTANEOUS
Qty: 3 ML | Refills: 2 | Status: SHIPPED | OUTPATIENT
Start: 2023-09-28

## 2023-09-28 NOTE — PROGRESS NOTES
CC -   Follow up of: Wants to lose weight for health concerns. Would like to be <140 lbs    BACKGROUND -   Last visit: 7/11/23  First visit: 9/22/22    Obesity (all weight in lbs)  Began in 2018  Initial BMI 38.51, Wt 231, Ht 65\"  HS Grad wt 145   Lowest   wt 140   Highest  wt 231.4  Pattern of wt gain: gradual  Wt change past yr: +24 lbs  Most wt lost: ~20 lbs (last summer)  Other diets attempted: changed diet - more salads/fruit, walking    Desire to lose weight: 10/10    Initial Diet:    Number of meals per day - 3 (B-L-D)    Number of snacks per day - 1 (night snack)    Meal volume - 12\" plate,  never seconds    Fast food/convenience store - 0-1x/week    Restaurants (not fast food) - 0-1x/week   Sweets - 7d/week (after dinner)   Chips - 7d/week   Crackers/pretzels - 7d/week (triscuits)   Nuts - 0d/week   Peanut Butter - 0d/week   Popcorn - 0d/week   Dried fruit - 0d/week   Whole fruit - 2d/week   Breakfast cereal - 0d/week   Granola/Protein/Energy bar - 0d/week   Sugar sweetened beverages - gingerale ~1x/wk. Orange juice about 1xwk, no coffee lately, tea (calorie free), no energy drink   Protein - No supplements currently   Fiber - No supplements     Initial Exercise:    Gym membership - no (has treadmill)    Walking - no    Running - no    Resistance - no    Aerobic class - no     Bedtime 10-10:30->~4:50->7/8am. Daytime tiredness+. No daytime naps.  ______________________    [facility] -  Past Medical History:   Diagnosis Date    ADHD     Anxiety     Depression     GERD (gastroesophageal reflux disease)     Granuloma annulare     Kidney stone     Migraine    Chronic Inflammatory Response Syndrome (??)  Past Surgical History:   Procedure Laterality Date    FINGER SURGERY Left 08/05/2015    Dr. Antonio Mcadams, ring finger; path report = granuloma annulare    TONSILLECTOMY       Prior to Admission medications    Medication Sig Start Date End Date Taking?  Authorizing Provider   amphetamine-dextroamphetamine (ADDERALL XR)

## 2023-09-28 NOTE — PATIENT INSTRUCTIONS
Rules:  Count every calorie every day  Limit sweets to one day per month  Limit chips/crackers/pretzels/nuts/popcorn to 150 jose francisco/day  Eliminate all sugar sweetened beverages (including fruit juice)  Limit restaurants (including fast food and food from a convenience store) to one time every two weeks while in town    Requirements:  Make sure protein intake is at least 70 grams per day   Make sure that fiber intake is at least 25 grams per day  Take one multivitamin every day    Targets:  Limit calorie intake to 1400 calories/day  Walk 30 minutes daily   Avoid eating 2 hours within bedtime. Tips:  Do not eat outside of the dining room or the kitchen  Do not eat while watching TV, videos, working on the computer or using a smart phone  Do not eat food out of a multi-serving bag or container. Wegovy: Take Wegovy 1.7 mg under the skin once a week. Follow up in 2-3 months.

## 2023-10-12 NOTE — PROGRESS NOTES
cases)      [x] Please do not wear any nail polish, make up or hair products on the day of surgery    [x] You can expect a call the business day prior to procedure to notify you if your arrival time changes    [x] If you receive a survey after surgery we would greatly appreciate your comments    [] Parent/guardian of a minor must accompany their child and remain on the premises  the entire time they are under our care     [] Pediatric patients may bring favorite toy, blanket or comfort item with them    [] A caregiver or family member must remain with the patient during their stay if they are mentally handicapped, have dementia, disoriented or unable to use a call light or would be a safety concern if left unattended    [x] Please notify surgeon if you develop any illness between now and time of surgery (cold, cough, sore throat, fever, nausea, vomiting) or any signs of infections  including skin, wounds, and dental.    [x]  The Outpatient Pharmacy is available to fill your prescription here on your day of surgery, ask your preop nurse for details    [] Other instructions    EDUCATIONAL MATERIALS PROVIDED:    [] PAT Preoperative Education Packet/Booklet     [] Medication List    [] Transfusion bracelet applied with instructions    [] Shower with soap, lather and rinse well, and use CHG wipes provided the evening before surgery as instructed    [] Incentive spirometer with instructions

## 2023-10-15 ENCOUNTER — PREP FOR PROCEDURE (OUTPATIENT)
Dept: UROLOGY | Age: 43
End: 2023-10-15

## 2023-10-15 RX ORDER — SODIUM CHLORIDE 9 MG/ML
INJECTION, SOLUTION INTRAVENOUS PRN
Status: CANCELLED | OUTPATIENT
Start: 2023-10-15

## 2023-10-15 RX ORDER — SODIUM CHLORIDE 9 MG/ML
INJECTION, SOLUTION INTRAVENOUS CONTINUOUS
Status: CANCELLED | OUTPATIENT
Start: 2023-10-15

## 2023-10-15 RX ORDER — SODIUM CHLORIDE 0.9 % (FLUSH) 0.9 %
5-40 SYRINGE (ML) INJECTION EVERY 12 HOURS SCHEDULED
Status: CANCELLED | OUTPATIENT
Start: 2023-10-15

## 2023-10-15 RX ORDER — SODIUM CHLORIDE 0.9 % (FLUSH) 0.9 %
5-40 SYRINGE (ML) INJECTION PRN
Status: CANCELLED | OUTPATIENT
Start: 2023-10-15

## 2023-10-16 ENCOUNTER — ANESTHESIA EVENT (OUTPATIENT)
Dept: OPERATING ROOM | Age: 43
End: 2023-10-16
Payer: COMMERCIAL

## 2023-10-17 ENCOUNTER — ANESTHESIA (OUTPATIENT)
Dept: OPERATING ROOM | Age: 43
End: 2023-10-17
Payer: COMMERCIAL

## 2023-10-17 ENCOUNTER — HOSPITAL ENCOUNTER (OUTPATIENT)
Age: 43
Setting detail: OUTPATIENT SURGERY
Discharge: HOME | End: 2023-10-17
Attending: UROLOGY | Admitting: UROLOGY
Payer: COMMERCIAL

## 2023-10-17 VITALS
DIASTOLIC BLOOD PRESSURE: 71 MMHG | BODY MASS INDEX: 32.82 KG/M2 | TEMPERATURE: 96.5 F | WEIGHT: 197 LBS | HEART RATE: 88 BPM | HEIGHT: 65 IN | SYSTOLIC BLOOD PRESSURE: 112 MMHG | OXYGEN SATURATION: 99 % | RESPIRATION RATE: 16 BRPM

## 2023-10-17 DIAGNOSIS — G89.18 POST-OPERATIVE PAIN: Primary | ICD-10-CM

## 2023-10-17 LAB
HCG, URINE, POC: NEGATIVE
Lab: NORMAL
NEGATIVE QC PASS/FAIL: NORMAL
POSITIVE QC PASS/FAIL: NORMAL

## 2023-10-17 PROCEDURE — 7100000010 HC PHASE II RECOVERY - FIRST 15 MIN: Performed by: UROLOGY

## 2023-10-17 PROCEDURE — 3700000001 HC ADD 15 MINUTES (ANESTHESIA): Performed by: UROLOGY

## 2023-10-17 PROCEDURE — 2500000003 HC RX 250 WO HCPCS: Performed by: NURSE ANESTHETIST, CERTIFIED REGISTERED

## 2023-10-17 PROCEDURE — 3600000003 HC SURGERY LEVEL 3 BASE: Performed by: UROLOGY

## 2023-10-17 PROCEDURE — 2580000003 HC RX 258: Performed by: NURSE ANESTHETIST, CERTIFIED REGISTERED

## 2023-10-17 PROCEDURE — 7100000000 HC PACU RECOVERY - FIRST 15 MIN: Performed by: UROLOGY

## 2023-10-17 PROCEDURE — 3700000000 HC ANESTHESIA ATTENDED CARE: Performed by: UROLOGY

## 2023-10-17 PROCEDURE — 3600000013 HC SURGERY LEVEL 3 ADDTL 15MIN: Performed by: UROLOGY

## 2023-10-17 PROCEDURE — 6360000002 HC RX W HCPCS: Performed by: NURSE ANESTHETIST, CERTIFIED REGISTERED

## 2023-10-17 PROCEDURE — 7100000001 HC PACU RECOVERY - ADDTL 15 MIN: Performed by: UROLOGY

## 2023-10-17 PROCEDURE — 7100000011 HC PHASE II RECOVERY - ADDTL 15 MIN: Performed by: UROLOGY

## 2023-10-17 PROCEDURE — 6360000002 HC RX W HCPCS: Performed by: NURSE PRACTITIONER

## 2023-10-17 RX ORDER — TAMSULOSIN HYDROCHLORIDE 0.4 MG/1
0.4 CAPSULE ORAL DAILY
Qty: 14 CAPSULE | Refills: 0 | Status: SHIPPED | OUTPATIENT
Start: 2023-10-17 | End: 2024-10-16

## 2023-10-17 RX ORDER — LEVOFLOXACIN 5 MG/ML
500 INJECTION, SOLUTION INTRAVENOUS
Status: COMPLETED | OUTPATIENT
Start: 2023-10-17 | End: 2023-10-17

## 2023-10-17 RX ORDER — MEPERIDINE HYDROCHLORIDE 25 MG/ML
12.5 INJECTION INTRAMUSCULAR; INTRAVENOUS; SUBCUTANEOUS EVERY 5 MIN PRN
Status: DISCONTINUED | OUTPATIENT
Start: 2023-10-17 | End: 2023-10-17 | Stop reason: HOSPADM

## 2023-10-17 RX ORDER — SODIUM CHLORIDE 0.9 % (FLUSH) 0.9 %
5-40 SYRINGE (ML) INJECTION EVERY 12 HOURS SCHEDULED
Status: DISCONTINUED | OUTPATIENT
Start: 2023-10-17 | End: 2023-10-17 | Stop reason: HOSPADM

## 2023-10-17 RX ORDER — SODIUM CHLORIDE 9 MG/ML
INJECTION, SOLUTION INTRAVENOUS PRN
Status: DISCONTINUED | OUTPATIENT
Start: 2023-10-17 | End: 2023-10-17 | Stop reason: HOSPADM

## 2023-10-17 RX ORDER — ACETAMINOPHEN AND CODEINE PHOSPHATE 300; 30 MG/1; MG/1
1-2 TABLET ORAL EVERY 6 HOURS PRN
Qty: 10 TABLET | Refills: 0 | Status: SHIPPED | OUTPATIENT
Start: 2023-10-17 | End: 2023-10-24

## 2023-10-17 RX ORDER — SODIUM CHLORIDE 9 MG/ML
INJECTION, SOLUTION INTRAVENOUS CONTINUOUS PRN
Status: DISCONTINUED | OUTPATIENT
Start: 2023-10-17 | End: 2023-10-17 | Stop reason: SDUPTHER

## 2023-10-17 RX ORDER — HYDRALAZINE HYDROCHLORIDE 20 MG/ML
10 INJECTION INTRAMUSCULAR; INTRAVENOUS
Status: DISCONTINUED | OUTPATIENT
Start: 2023-10-17 | End: 2023-10-17 | Stop reason: HOSPADM

## 2023-10-17 RX ORDER — SODIUM CHLORIDE 0.9 % (FLUSH) 0.9 %
5-40 SYRINGE (ML) INJECTION PRN
Status: DISCONTINUED | OUTPATIENT
Start: 2023-10-17 | End: 2023-10-17 | Stop reason: HOSPADM

## 2023-10-17 RX ORDER — ONDANSETRON 2 MG/ML
INJECTION INTRAMUSCULAR; INTRAVENOUS PRN
Status: DISCONTINUED | OUTPATIENT
Start: 2023-10-17 | End: 2023-10-17 | Stop reason: SDUPTHER

## 2023-10-17 RX ORDER — IPRATROPIUM BROMIDE AND ALBUTEROL SULFATE 2.5; .5 MG/3ML; MG/3ML
1 SOLUTION RESPIRATORY (INHALATION)
Status: DISCONTINUED | OUTPATIENT
Start: 2023-10-17 | End: 2023-10-17 | Stop reason: HOSPADM

## 2023-10-17 RX ORDER — MIDAZOLAM HYDROCHLORIDE 1 MG/ML
INJECTION INTRAMUSCULAR; INTRAVENOUS PRN
Status: DISCONTINUED | OUTPATIENT
Start: 2023-10-17 | End: 2023-10-17 | Stop reason: SDUPTHER

## 2023-10-17 RX ORDER — DEXAMETHASONE SODIUM PHOSPHATE 4 MG/ML
INJECTION, SOLUTION INTRA-ARTICULAR; INTRALESIONAL; INTRAMUSCULAR; INTRAVENOUS; SOFT TISSUE PRN
Status: DISCONTINUED | OUTPATIENT
Start: 2023-10-17 | End: 2023-10-17 | Stop reason: SDUPTHER

## 2023-10-17 RX ORDER — PROPOFOL 10 MG/ML
INJECTION, EMULSION INTRAVENOUS PRN
Status: DISCONTINUED | OUTPATIENT
Start: 2023-10-17 | End: 2023-10-17 | Stop reason: SDUPTHER

## 2023-10-17 RX ORDER — SODIUM CHLORIDE 9 MG/ML
INJECTION, SOLUTION INTRAVENOUS CONTINUOUS
Status: DISCONTINUED | OUTPATIENT
Start: 2023-10-17 | End: 2023-10-17 | Stop reason: HOSPADM

## 2023-10-17 RX ORDER — LABETALOL HYDROCHLORIDE 5 MG/ML
10 INJECTION, SOLUTION INTRAVENOUS
Status: DISCONTINUED | OUTPATIENT
Start: 2023-10-17 | End: 2023-10-17 | Stop reason: HOSPADM

## 2023-10-17 RX ORDER — LIDOCAINE HYDROCHLORIDE 20 MG/ML
INJECTION, SOLUTION EPIDURAL; INFILTRATION; INTRACAUDAL; PERINEURAL PRN
Status: DISCONTINUED | OUTPATIENT
Start: 2023-10-17 | End: 2023-10-17 | Stop reason: SDUPTHER

## 2023-10-17 RX ORDER — GLYCOPYRROLATE 0.2 MG/ML
INJECTION INTRAMUSCULAR; INTRAVENOUS PRN
Status: DISCONTINUED | OUTPATIENT
Start: 2023-10-17 | End: 2023-10-17 | Stop reason: SDUPTHER

## 2023-10-17 RX ORDER — FENTANYL CITRATE 50 UG/ML
INJECTION, SOLUTION INTRAMUSCULAR; INTRAVENOUS PRN
Status: DISCONTINUED | OUTPATIENT
Start: 2023-10-17 | End: 2023-10-17 | Stop reason: SDUPTHER

## 2023-10-17 RX ORDER — ONDANSETRON 2 MG/ML
4 INJECTION INTRAMUSCULAR; INTRAVENOUS
Status: DISCONTINUED | OUTPATIENT
Start: 2023-10-17 | End: 2023-10-17 | Stop reason: HOSPADM

## 2023-10-17 RX ORDER — MIDAZOLAM HYDROCHLORIDE 2 MG/2ML
2 INJECTION, SOLUTION INTRAMUSCULAR; INTRAVENOUS
Status: DISCONTINUED | OUTPATIENT
Start: 2023-10-17 | End: 2023-10-17 | Stop reason: HOSPADM

## 2023-10-17 RX ADMIN — SODIUM CHLORIDE: 9 INJECTION, SOLUTION INTRAVENOUS at 10:35

## 2023-10-17 RX ADMIN — SODIUM CHLORIDE: 9 INJECTION, SOLUTION INTRAVENOUS at 09:43

## 2023-10-17 RX ADMIN — PROPOFOL 200 MG: 10 INJECTION, EMULSION INTRAVENOUS at 09:47

## 2023-10-17 RX ADMIN — GLYCOPYRROLATE 0.1 MG: 0.2 INJECTION INTRAMUSCULAR; INTRAVENOUS at 09:52

## 2023-10-17 RX ADMIN — DEXAMETHASONE SODIUM PHOSPHATE 10 MG: 4 INJECTION, SOLUTION INTRAMUSCULAR; INTRAVENOUS at 09:52

## 2023-10-17 RX ADMIN — LIDOCAINE HYDROCHLORIDE 100 MG: 20 INJECTION, SOLUTION EPIDURAL; INFILTRATION; INTRACAUDAL; PERINEURAL at 09:47

## 2023-10-17 RX ADMIN — ONDANSETRON 4 MG: 2 INJECTION INTRAMUSCULAR; INTRAVENOUS at 09:52

## 2023-10-17 RX ADMIN — LEVOFLOXACIN 500 MG: 5 INJECTION, SOLUTION INTRAVENOUS at 08:56

## 2023-10-17 RX ADMIN — FENTANYL CITRATE 50 MCG: 50 INJECTION, SOLUTION INTRAMUSCULAR; INTRAVENOUS at 09:47

## 2023-10-17 RX ADMIN — FENTANYL CITRATE 50 MCG: 50 INJECTION, SOLUTION INTRAMUSCULAR; INTRAVENOUS at 10:42

## 2023-10-17 RX ADMIN — MIDAZOLAM 2 MG: 1 INJECTION INTRAMUSCULAR; INTRAVENOUS at 09:42

## 2023-10-17 ASSESSMENT — PAIN - FUNCTIONAL ASSESSMENT: PAIN_FUNCTIONAL_ASSESSMENT: 0-10

## 2023-10-17 NOTE — OP NOTE
Operative Note      Patient: Coco Boyer  YOB: 1980  MRN: [id number]    Date of Procedure: 10/17/2023    Pre-Op Diagnosis Codes:     * Calculus, kidney [N20.0], bilateral     Post-Op Diagnosis: Same       Procedure(s):  BILATERAL ESWL EXTRACORPOREAL SHOCK WAVE LITHOTRIPSY  ++LATEX ALLERGY++            ++STAFF FROM ROOM 6++    Surgeon(s):  Francisco Prescott MD    Assistant:   * No surgical staff found *    Anesthesia: General    Estimated Blood Loss (mL): Minimal    Complications: None    Specimens:   * No specimens in log *    Implants:  * No implants in log *      Drains: * No LDAs found *    INDICATION FOR PROCEDURE:    Coco Boyer is a 43 y.o. female with a 0.6 cm stone in the left kidney and 3 and 4 mm stones in the right kidney on KUB. She was given all treatment options and risks of these options and we agreed together to treat with extracorporeal shock wave lithotripsy. We discussed this procedure in depth including the risks specific to this procedure. We discussed perinephric hematoma, elias strausse, the possibility of incomplete stone fragmentation requiring further procedures, the chronic effects of shock wave lithotripsy on the kidney if done in excess, as well as the general risks of any surgical procedure. Informed consent was obtained. OPERATIVE NOTE:    Coco Boyer was brought into the operating room and placed under general anesthesia on the [employer] mobile lithotripsy table. The stones were clearly identified under flouroscopy and placed by the technician and into the F2 region of the lithotripter. As opposed to KUB there were 4 stones on the left and 2 on the right. A total of 3,000 shocks were delivered at a rate of 90 shocks / second. 1500 shocks were delivered to the left side and 1500 to the right side. All the stones except the left lower pole stone were treated successfully. An energy level of 7 was utilized during the procedure.  At the

## 2023-10-17 NOTE — H&P
Doris Ya is a 43 y.o. female with nephrolithiasis for ESWL. Past Medical History:   Diagnosis Date    ADHD     Anxiety     Depression     GERD (gastroesophageal reflux disease)     Granuloma annulare     Kidney stone     Migraine     OCD (obsessive compulsive disorder)        Past Surgical History:   Procedure Laterality Date    CHOLECYSTECTOMY      FINGER SURGERY Left 08/05/2015    Dr. Ivelisse Waller, ring finger; path report = granuloma annulare    TONSILLECTOMY         Family History   Problem Relation Age of Onset    Uterine Cancer Mother     Thyroid Disease Mother     Osteoporosis Mother     No Known Problems Father        Prior to Admission medications    Medication Sig Start Date End Date Taking? Authorizing Provider   amphetamine-dextroamphetamine (ADDERALL XR) 25 MG extended release capsule TAKE 1 CAPSULE BY MOUTH AFTER BREAKFAST 9/11/23   Ama Nj MD   Semaglutide-Weight Management (WEGOVY) 1.7 MG/0.75ML SOAJ SC injection Inject 1.7 mg into the skin every 7 days 9/28/23   Rambo Leonard MD   liraglutide-weight management 18 MG/3ML SOPN Inject 0.6 mg of Saxenda under the skin once each day; every week add 0.6 mg to the daily dose; keep increasing the dose until reaching 3 mg each day.  7/11/23   Rambo Leonard MD   Insulin Pen Needle 31G X 5 MM MISC 1 each by Does not apply route daily 6/25/23   Linda Leonard MD   topiramate (TOPAMAX) 200 MG tablet TAKE 1 TABLET BY MOUTH EVERYDAY AT BEDTIME 3/14/22   Ama Nj MD   LINZESS 72 MCG CAPS capsule Take 1 capsule by mouth every morning (before breakfast) 8/25/21   Ama Nj MD   traZODone (DESYREL) 100 MG tablet TAKE 1 TABLET BY MOUTH EVERY DAY AT BEDTIME AS NEEDED 9/27/21   Ama Nj MD   cloNIDine (CATAPRES) 0.2 MG tablet Take 0.5 tablets by mouth nightly 8/28/20   Ama Nj MD   ARIPiprazole (ABILIFY) 15 MG tablet Take 1 tablet by mouth every morning    Ama Nj

## 2023-10-17 NOTE — ANESTHESIA POSTPROCEDURE EVALUATION
Department of Anesthesiology  Postprocedure Note    Patient: Patsy Hopkins  MRN: [id number]  YOB: 1980  Date of evaluation: 10/17/2023      Procedure Summary     Date: 10/17/23 Room / Location: [facility] OR 05 / [facility]    Anesthesia Start: 5369 Anesthesia Stop: 2288    Procedure: BILATERAL ESWL EXTRACORPOREAL SHOCK WAVE LITHOTRIPSY  ++LATEX ALLERGY++            ++STAFF FROM ROOM 6++ (Bilateral) Diagnosis:       Calculus, kidney      (Calculus, kidney [N20.0])    Surgeons: Sabine Hernandez MD Responsible Provider: Yesy Hand DO    Anesthesia Type: General ASA Status: 2          Anesthesia Type: General    Fermin Phase I: Fermin Score: 10    Fermni Phase II:        Anesthesia Post Evaluation    Patient location during evaluation: PACU  Patient participation: complete - patient participated  Level of consciousness: sleepy but conscious  Pain score: 0  Airway patency: patent  Nausea & Vomiting: no vomiting and no nausea  Complications: no  Cardiovascular status: hemodynamically stable  Respiratory status: face mask  Hydration status: stable  Pain management: adequate

## 2023-10-17 NOTE — DISCHARGE INSTRUCTIONS
Discharge instructions for Dr. Nubia Almeida after Shock Wave Lithotripsy    General:   -You will be groggy throughout the day due to the effects of anesthesia. Have a responsible adult monitor you for the next 24 hours.  -Call if fever or chills  -It is very normal to have burning and pain. If the pain is severe and out of your control contact Dr. Martín Pavon. Diet: You may eat or drink whatever you like today. You may experience some nausea. Call if you have vomiting or inability to tolerate food or drink. Urine: Expect blood in your urine. This is normal.  This may be very traumatic in appearance but is not concerning unless there are large amount of clots that prevent you from being able to urinate. If you have any questions or concerns contact Dr. Kristen Sheehan office. Driving: You may not drive for 24 hours. You also may not drive if you are taking narcotic pain medications. Activity: There are no restrictions on your activity. You will pass tiny dust fragments which are the stone that has been broken up. If you can save a small amount of this stone by straining your urine this will help to determine why you might get kidney stones. Please bring this dust with you to your follow up appointment.

## 2023-10-17 NOTE — ANESTHESIA PRE PROCEDURE
discussed with patient/legal guardian. Patient/legal guardian verbalized understanding and agrees to proceed.      MARIELENA Mendoza - CRNA  Staff CRNA  October 17, 2023  9:29 AM                  Dennis Chong DO   10/17/2023

## 2023-10-30 ENCOUNTER — OFFICE VISIT (OUTPATIENT)
Dept: FAMILY MEDICINE CLINIC | Age: 43
End: 2023-10-30
Payer: COMMERCIAL

## 2023-10-30 VITALS
OXYGEN SATURATION: 98 % | HEART RATE: 108 BPM | BODY MASS INDEX: 32.78 KG/M2 | HEIGHT: 64 IN | SYSTOLIC BLOOD PRESSURE: 102 MMHG | RESPIRATION RATE: 16 BRPM | WEIGHT: 192 LBS | TEMPERATURE: 97.7 F | DIASTOLIC BLOOD PRESSURE: 80 MMHG

## 2023-10-30 DIAGNOSIS — E66.09 CLASS 2 OBESITY DUE TO EXCESS CALORIES WITHOUT SERIOUS COMORBIDITY WITH BODY MASS INDEX (BMI) OF 36.0 TO 36.9 IN ADULT: Primary | ICD-10-CM

## 2023-10-30 DIAGNOSIS — E66.09 CLASS 2 OBESITY DUE TO EXCESS CALORIES WITHOUT SERIOUS COMORBIDITY WITH BODY MASS INDEX (BMI) OF 36.0 TO 36.9 IN ADULT: ICD-10-CM

## 2023-10-30 LAB
ABSOLUTE IMMATURE GRANULOCYTE: 0.04 K/UL (ref 0–0.58)
ALBUMIN SERPL-MCNC: 4.8 G/DL (ref 3.5–5.2)
ALP BLD-CCNC: 102 U/L (ref 35–104)
ALT SERPL-CCNC: 15 U/L (ref 0–32)
ANION GAP SERPL CALCULATED.3IONS-SCNC: 16 MMOL/L (ref 7–16)
AST SERPL-CCNC: 21 U/L (ref 0–31)
BASOPHILS ABSOLUTE: 0.05 K/UL (ref 0–0.2)
BASOPHILS RELATIVE PERCENT: 1 % (ref 0–2)
BILIRUB SERPL-MCNC: 0.7 MG/DL (ref 0–1.2)
BUN BLDV-MCNC: 14 MG/DL (ref 6–20)
CALCIUM SERPL-MCNC: 10.5 MG/DL (ref 8.6–10.2)
CHLORIDE BLD-SCNC: 104 MMOL/L (ref 98–107)
CO2: 21 MMOL/L (ref 22–29)
CREAT SERPL-MCNC: 1.2 MG/DL (ref 0.5–1)
EOSINOPHILS ABSOLUTE: 0.11 K/UL (ref 0.05–0.5)
EOSINOPHILS RELATIVE PERCENT: 1 % (ref 0–6)
GFR SERPL CREATININE-BSD FRML MDRD: 59 ML/MIN/1.73M2
GLUCOSE BLD-MCNC: 99 MG/DL (ref 74–99)
HCT VFR BLD CALC: 47.4 % (ref 34–48)
HEMOGLOBIN: 15.9 G/DL (ref 11.5–15.5)
IMMATURE GRANULOCYTES: 1 % (ref 0–5)
LYMPHOCYTES ABSOLUTE: 2.14 K/UL (ref 1.5–4)
LYMPHOCYTES RELATIVE PERCENT: 26 % (ref 20–42)
MCH RBC QN AUTO: 31.7 PG (ref 26–35)
MCHC RBC AUTO-ENTMCNC: 33.5 G/DL (ref 32–34.5)
MCV RBC AUTO: 94.6 FL (ref 80–99.9)
MONOCYTES ABSOLUTE: 0.67 K/UL (ref 0.1–0.95)
MONOCYTES RELATIVE PERCENT: 8 % (ref 2–12)
NEUTROPHILS ABSOLUTE: 5.18 K/UL (ref 1.8–7.3)
NEUTROPHILS RELATIVE PERCENT: 63 % (ref 43–80)
PDW BLD-RTO: 12.8 % (ref 11.5–15)
PLATELET # BLD: 270 K/UL (ref 130–450)
PMV BLD AUTO: 11.8 FL (ref 7–12)
POTASSIUM SERPL-SCNC: 4.3 MMOL/L (ref 3.5–5)
RBC # BLD: 5.01 M/UL (ref 3.5–5.5)
SODIUM BLD-SCNC: 141 MMOL/L (ref 132–146)
TOTAL PROTEIN: 7.6 G/DL (ref 6.4–8.3)
WBC # BLD: 8.2 K/UL (ref 4.5–11.5)

## 2023-10-30 PROCEDURE — G8484 FLU IMMUNIZE NO ADMIN: HCPCS | Performed by: FAMILY MEDICINE

## 2023-10-30 PROCEDURE — G8417 CALC BMI ABV UP PARAM F/U: HCPCS | Performed by: FAMILY MEDICINE

## 2023-10-30 PROCEDURE — G8427 DOCREV CUR MEDS BY ELIG CLIN: HCPCS | Performed by: FAMILY MEDICINE

## 2023-10-30 PROCEDURE — 99213 OFFICE O/P EST LOW 20 MIN: CPT | Performed by: FAMILY MEDICINE

## 2023-10-30 PROCEDURE — 1036F TOBACCO NON-USER: CPT | Performed by: FAMILY MEDICINE

## 2023-10-30 ASSESSMENT — ENCOUNTER SYMPTOMS
ALLERGIC/IMMUNOLOGIC NEGATIVE: 1
BACK PAIN: 0
PHOTOPHOBIA: 0
VOMITING: 0
CHEST TIGHTNESS: 0
SORE THROAT: 0
TROUBLE SWALLOWING: 0
BLOOD IN STOOL: 0
DIARRHEA: 0
ABDOMINAL PAIN: 0
SHORTNESS OF BREATH: 0
EYE REDNESS: 0
EYE DISCHARGE: 0
ROS SKIN COMMENTS: SKIN DRYNESS
COUGH: 0
SINUS PAIN: 0
NAUSEA: 0
EYE PAIN: 0

## 2023-10-30 NOTE — PROGRESS NOTES
10/30/23  [facility], [facility] : 1980 Sex: female  Age: 43 y.o. Assessment and Plan:  Forest Looney was seen today for obesity. Diagnoses and all orders for this visit:    Class 2 obesity due to excess calories without serious comorbidity with body mass index (BMI) of 36.0 to 36.9 in adult  -     CBC with Auto Differential; Future  -     Comprehensive Metabolic Panel; Future    She is receiving Wegovy through the [address] [place] [address] Bl, [address] program.  Her psychiatric medications for behavioral specialist.  She is to follow-up in 3 months, would like to establish with Dr. Martínez Oliva. Return in about 3 months (around 2024), or Tablets with Dr. Martínez Oliva. Chief Complaint   Patient presents with    Obesity       Patient here for follow-up. She is seeing bariatrics and has lost over 40 pounds on Wegovy. Finding of some mild skin sensitivity but also to moisturize at least twice daily. Review of Systems   Constitutional:  Negative for appetite change, fatigue and unexpected weight change. HENT:  Negative for congestion, ear pain, hearing loss, sinus pain, sore throat and trouble swallowing. Eyes:  Negative for photophobia, pain, discharge and redness. Respiratory:  Negative for cough, chest tightness and shortness of breath. Cardiovascular:  Negative for chest pain, palpitations and leg swelling. Gastrointestinal:  Negative for abdominal pain, blood in stool, diarrhea, nausea and vomiting. Endocrine: Negative. Genitourinary:  Negative for dysuria, flank pain, frequency and hematuria. Musculoskeletal:  Negative for arthralgias, back pain, joint swelling and myalgias. Skin: Negative. Skin dryness   Allergic/Immunologic: Negative. Neurological:  Negative for dizziness, seizures, syncope, weakness, light-headedness, numbness and headaches. Hematological:  Negative for adenopathy. Does not bruise/bleed easily. Psychiatric/Behavioral: Negative.            Current Outpatient

## 2023-12-12 ENCOUNTER — OFFICE VISIT (OUTPATIENT)
Dept: BARIATRICS/WEIGHT MGMT | Age: 43
End: 2023-12-12
Payer: COMMERCIAL

## 2023-12-12 VITALS
SYSTOLIC BLOOD PRESSURE: 93 MMHG | TEMPERATURE: 97.3 F | HEART RATE: 83 BPM | WEIGHT: 179.8 LBS | BODY MASS INDEX: 29.96 KG/M2 | DIASTOLIC BLOOD PRESSURE: 61 MMHG | HEIGHT: 65 IN

## 2023-12-12 DIAGNOSIS — E66.09 CLASS 1 OBESITY DUE TO EXCESS CALORIES WITH SERIOUS COMORBIDITY AND BODY MASS INDEX (BMI) OF 32.0 TO 32.9 IN ADULT: ICD-10-CM

## 2023-12-12 DIAGNOSIS — R53.82 CHRONIC FATIGUE: Primary | ICD-10-CM

## 2023-12-12 PROCEDURE — 99214 OFFICE O/P EST MOD 30 MIN: CPT | Performed by: INTERNAL MEDICINE

## 2023-12-12 PROCEDURE — G8484 FLU IMMUNIZE NO ADMIN: HCPCS | Performed by: INTERNAL MEDICINE

## 2023-12-12 PROCEDURE — 99211 OFF/OP EST MAY X REQ PHY/QHP: CPT

## 2023-12-12 PROCEDURE — 1036F TOBACCO NON-USER: CPT | Performed by: INTERNAL MEDICINE

## 2023-12-12 PROCEDURE — G8428 CUR MEDS NOT DOCUMENT: HCPCS | Performed by: INTERNAL MEDICINE

## 2023-12-12 PROCEDURE — G8417 CALC BMI ABV UP PARAM F/U: HCPCS | Performed by: INTERNAL MEDICINE

## 2023-12-12 RX ORDER — SEMAGLUTIDE 1.7 MG/.75ML
1.7 INJECTION, SOLUTION SUBCUTANEOUS
Qty: 3 ML | Refills: 4 | Status: SHIPPED | OUTPATIENT
Start: 2023-12-12

## 2023-12-12 NOTE — PATIENT INSTRUCTIONS
Rules:  Count every calorie every day  Limit sweets to one day per month  Limit chips/crackers/pretzels/nuts/popcorn to 150 jose francisco/day  Eliminate all sugar sweetened beverages (including fruit juice)  Limit restaurants (including fast food and food from a convenience store) to one time every two weeks while in town    Requirements:  Make sure protein intake is at least 70 grams per day   Make sure that fiber intake is at least 25 grams per day  Take one multivitamin every day    Targets:  Limit calorie intake to 1400 calories/day  Walk 30 minutes daily   Avoid eating 2 hours within bedtime. Tips:  Do not eat outside of the dining room or the kitchen  Do not eat while watching TV, videos, working on the computer or using a smart phone  Do not eat food out of a multi-serving bag or container. Wegovy: Take Wegovy 1.7 mg under the skin once a week. Follow up in about 3 months.

## 2023-12-12 NOTE — PROGRESS NOTES
CC -   Follow up of: Wants to lose weight for health concerns. Would like to be <140 lbs    BACKGROUND -   Last visit: 9/28/23  First visit: 9/22/22    Obesity (all weight in lbs)  Began in 2018  Initial BMI 38.51, Wt 231, Ht 65\"  HS Grad wt 145   Lowest   wt 140   Highest  wt 231.4  Pattern of wt gain: gradual  Wt change past yr: +24 lbs  Most wt lost: ~20 lbs (last summer)  Other diets attempted: changed diet - more salads/fruit, walking    Desire to lose weight: 10/10    Initial Diet:    Number of meals per day - 3 (B-L-D)    Number of snacks per day - 1 (night snack)    Meal volume - 12\" plate,  never seconds    Fast food/convenience store - 0-1x/week    Restaurants (not fast food) - 0-1x/week   Sweets - 7d/week (after dinner)   Chips - 7d/week   Crackers/pretzels - 7d/week (triscuits)   Nuts - 0d/week   Peanut Butter - 0d/week   Popcorn - 0d/week   Dried fruit - 0d/week   Whole fruit - 2d/week   Breakfast cereal - 0d/week   Granola/Protein/Energy bar - 0d/week   Sugar sweetened beverages - gingerale ~1x/wk. Orange juice about 1xwk, no coffee lately, tea (calorie free), no energy drink   Protein - No supplements currently   Fiber - No supplements     Initial Exercise:    Gym membership - no (has treadmill)    Walking - no    Running - no    Resistance - no    Aerobic class - no     Bedtime 10-10:30->~4:50->7/8am. Daytime tiredness+.  No daytime naps.  ______________________    [facility] -  Past Medical History:   Diagnosis Date    ADHD     Anxiety     Depression     GERD (gastroesophageal reflux disease)     Granuloma annulare     Kidney stone     Migraine     OCD (obsessive compulsive disorder)    Chronic Inflammatory Response Syndrome (??)  Past Surgical History:   Procedure Laterality Date    CHOLECYSTECTOMY      FINGER SURGERY Left 08/05/2015    Dr. Aundria Lefort, ring finger; path report = granuloma annulare    LITHOTRIPSY Bilateral 10/17/2023    BILATERAL ESWL EXTRACORPOREAL SHOCK WAVE LITHOTRIPSY performed by

## 2024-01-27 SDOH — HEALTH STABILITY: PHYSICAL HEALTH: ON AVERAGE, HOW MANY DAYS PER WEEK DO YOU ENGAGE IN MODERATE TO STRENUOUS EXERCISE (LIKE A BRISK WALK)?: 5 DAYS

## 2024-01-27 SDOH — HEALTH STABILITY: PHYSICAL HEALTH: ON AVERAGE, HOW MANY MINUTES DO YOU ENGAGE IN EXERCISE AT THIS LEVEL?: 10 MIN

## 2024-01-30 ENCOUNTER — OFFICE VISIT (OUTPATIENT)
Dept: FAMILY MEDICINE CLINIC | Age: 44
End: 2024-01-30
Payer: COMMERCIAL

## 2024-01-30 VITALS
HEIGHT: 64 IN | RESPIRATION RATE: 18 BRPM | OXYGEN SATURATION: 98 % | DIASTOLIC BLOOD PRESSURE: 82 MMHG | SYSTOLIC BLOOD PRESSURE: 128 MMHG | TEMPERATURE: 97.5 F | WEIGHT: 168 LBS | BODY MASS INDEX: 28.68 KG/M2 | HEART RATE: 86 BPM

## 2024-01-30 DIAGNOSIS — Z00.00 PREVENTATIVE HEALTH CARE: ICD-10-CM

## 2024-01-30 DIAGNOSIS — E66.09 CLASS 2 OBESITY DUE TO EXCESS CALORIES WITHOUT SERIOUS COMORBIDITY WITH BODY MASS INDEX (BMI) OF 36.0 TO 36.9 IN ADULT: ICD-10-CM

## 2024-01-30 DIAGNOSIS — N18.31 STAGE 3A CHRONIC KIDNEY DISEASE (HCC): Primary | ICD-10-CM

## 2024-01-30 DIAGNOSIS — K58.2 IRRITABLE BOWEL SYNDROME WITH BOTH CONSTIPATION AND DIARRHEA: ICD-10-CM

## 2024-01-30 DIAGNOSIS — N18.31 STAGE 3A CHRONIC KIDNEY DISEASE (HCC): ICD-10-CM

## 2024-01-30 DIAGNOSIS — E66.09 CLASS 1 OBESITY DUE TO EXCESS CALORIES WITH SERIOUS COMORBIDITY AND BODY MASS INDEX (BMI) OF 32.0 TO 32.9 IN ADULT: ICD-10-CM

## 2024-01-30 DIAGNOSIS — E66.01 CLASS 2 SEVERE OBESITY DUE TO EXCESS CALORIES WITH SERIOUS COMORBIDITY AND BODY MASS INDEX (BMI) OF 39.0 TO 39.9 IN ADULT (HCC): ICD-10-CM

## 2024-01-30 PROBLEM — H69.90 DYSFUNCTION OF EUSTACHIAN TUBE: Status: ACTIVE | Noted: 2019-09-04

## 2024-01-30 PROBLEM — D80.1 HYPOGAMMAGLOBULINEMIA (HCC): Status: ACTIVE | Noted: 2019-09-19

## 2024-01-30 LAB
ABSOLUTE IMMATURE GRANULOCYTE: 0.04 K/UL (ref 0–0.58)
ALBUMIN SERPL-MCNC: 4.6 G/DL (ref 3.5–5.2)
ALP BLD-CCNC: 80 U/L (ref 35–104)
ALT SERPL-CCNC: 10 U/L (ref 0–32)
ANION GAP SERPL CALCULATED.3IONS-SCNC: 19 MMOL/L (ref 7–16)
AST SERPL-CCNC: 18 U/L (ref 0–31)
BASOPHILS ABSOLUTE: 0.03 K/UL (ref 0–0.2)
BASOPHILS RELATIVE PERCENT: 1 % (ref 0–2)
BILIRUB SERPL-MCNC: 0.7 MG/DL (ref 0–1.2)
BUN BLDV-MCNC: 14 MG/DL (ref 6–20)
CALCIUM SERPL-MCNC: 10.2 MG/DL (ref 8.6–10.2)
CHLORIDE BLD-SCNC: 106 MMOL/L (ref 98–107)
CHOLESTEROL: 219 MG/DL
CO2: 19 MMOL/L (ref 22–29)
CREAT SERPL-MCNC: 1 MG/DL (ref 0.5–1)
EOSINOPHILS ABSOLUTE: 0.06 K/UL (ref 0.05–0.5)
EOSINOPHILS RELATIVE PERCENT: 1 % (ref 0–6)
GFR SERPL CREATININE-BSD FRML MDRD: >60 ML/MIN/1.73M2
GLUCOSE BLD-MCNC: 83 MG/DL (ref 74–99)
HCT VFR BLD CALC: 44.6 % (ref 34–48)
HDLC SERPL-MCNC: 91 MG/DL
HEMOGLOBIN: 14.4 G/DL (ref 11.5–15.5)
IMMATURE GRANULOCYTES: 1 % (ref 0–5)
LDL CHOLESTEROL: 117 MG/DL
LYMPHOCYTES ABSOLUTE: 1.45 K/UL (ref 1.5–4)
LYMPHOCYTES RELATIVE PERCENT: 22 % (ref 20–42)
MCH RBC QN AUTO: 31.4 PG (ref 26–35)
MCHC RBC AUTO-ENTMCNC: 32.3 G/DL (ref 32–34.5)
MCV RBC AUTO: 97.2 FL (ref 80–99.9)
MONOCYTES ABSOLUTE: 0.49 K/UL (ref 0.1–0.95)
MONOCYTES RELATIVE PERCENT: 7 % (ref 2–12)
NEUTROPHILS ABSOLUTE: 4.55 K/UL (ref 1.8–7.3)
NEUTROPHILS RELATIVE PERCENT: 69 % (ref 43–80)
PDW BLD-RTO: 13.8 % (ref 11.5–15)
PLATELET # BLD: 208 K/UL (ref 130–450)
PMV BLD AUTO: 11.4 FL (ref 7–12)
POTASSIUM SERPL-SCNC: 4.6 MMOL/L (ref 3.5–5)
RBC # BLD: 4.59 M/UL (ref 3.5–5.5)
SODIUM BLD-SCNC: 144 MMOL/L (ref 132–146)
TOTAL PROTEIN: 7.1 G/DL (ref 6.4–8.3)
TRIGL SERPL-MCNC: 53 MG/DL
TSH SERPL DL<=0.05 MIU/L-ACNC: 1.74 UIU/ML (ref 0.27–4.2)
VLDLC SERPL CALC-MCNC: 11 MG/DL
WBC # BLD: 6.6 K/UL (ref 4.5–11.5)

## 2024-01-30 PROCEDURE — 1036F TOBACCO NON-USER: CPT | Performed by: FAMILY MEDICINE

## 2024-01-30 PROCEDURE — G8417 CALC BMI ABV UP PARAM F/U: HCPCS | Performed by: FAMILY MEDICINE

## 2024-01-30 PROCEDURE — G8482 FLU IMMUNIZE ORDER/ADMIN: HCPCS | Performed by: FAMILY MEDICINE

## 2024-01-30 PROCEDURE — G8427 DOCREV CUR MEDS BY ELIG CLIN: HCPCS | Performed by: FAMILY MEDICINE

## 2024-01-30 PROCEDURE — 99214 OFFICE O/P EST MOD 30 MIN: CPT | Performed by: FAMILY MEDICINE

## 2024-01-30 RX ORDER — SEMAGLUTIDE 2.4 MG/.75ML
2.4 INJECTION, SOLUTION SUBCUTANEOUS
Qty: 3 ML | Refills: 2 | Status: SHIPPED | OUTPATIENT
Start: 2024-01-30

## 2024-01-30 ASSESSMENT — PATIENT HEALTH QUESTIONNAIRE - PHQ9
SUM OF ALL RESPONSES TO PHQ QUESTIONS 1-9: 3
7. TROUBLE CONCENTRATING ON THINGS, SUCH AS READING THE NEWSPAPER OR WATCHING TELEVISION: 0
SUM OF ALL RESPONSES TO PHQ QUESTIONS 1-9: 3
6. FEELING BAD ABOUT YOURSELF - OR THAT YOU ARE A FAILURE OR HAVE LET YOURSELF OR YOUR FAMILY DOWN: 0
SUM OF ALL RESPONSES TO PHQ9 QUESTIONS 1 & 2: 2
4. FEELING TIRED OR HAVING LITTLE ENERGY: 0
SUM OF ALL RESPONSES TO PHQ QUESTIONS 1-9: 3
3. TROUBLE FALLING OR STAYING ASLEEP: 1
5. POOR APPETITE OR OVEREATING: 0
9. THOUGHTS THAT YOU WOULD BE BETTER OFF DEAD, OR OF HURTING YOURSELF: 0
2. FEELING DOWN, DEPRESSED OR HOPELESS: 1
1. LITTLE INTEREST OR PLEASURE IN DOING THINGS: 1
SUM OF ALL RESPONSES TO PHQ QUESTIONS 1-9: 3
8. MOVING OR SPEAKING SO SLOWLY THAT OTHER PEOPLE COULD HAVE NOTICED. OR THE OPPOSITE, BEING SO FIGETY OR RESTLESS THAT YOU HAVE BEEN MOVING AROUND A LOT MORE THAN USUAL: 0

## 2024-01-30 NOTE — PROGRESS NOTES
[facility]    Leeann Milan presents to the office today for   Chief Complaint   Patient presents with    [facility] over [place] for first episode  She was pretty sick but feeling better    Obesity  She is following with Dr. Wegovy  Not vomiting anymore    Greg Mejias NP follows her for mental health  Anxiety and Depression, OCD, ADHD    Hx of Nephrolithiasis  Urologist in past Dr. Greg Devlin    GYN at [facility] Chanelle Randolph NP  Mammogram done in December 2023    IBS   Colonoscopy x 2 in past  Follows with [facility] in [facility]      No children    Working at home currently    Review of Systems     /82   Pulse 86   Temp 97.5 °F (36.4 °C) (Temporal)   Resp 18   Ht 1.626 m (5' 4\")   Wt 76.2 kg (168 lb)   SpO2 98%   BMI 28.84 kg/m²   Physical Exam  Constitutional:       Appearance: Normal appearance.   HENT:      Head: Normocephalic and atraumatic.   Eyes:      Extraocular Movements: Extraocular movements intact.      Conjunctiva/sclera: Conjunctivae normal.   Cardiovascular:      Rate and Rhythm: Normal rate.      Heart sounds: Normal heart sounds.   Pulmonary:      Effort: Pulmonary effort is normal.      Breath sounds: Normal breath sounds.   Skin:     General: Skin is warm.   Neurological:      Mental Status: She is alert and oriented to person, place, and time.   Psychiatric:         Mood and Affect: Mood normal.         Behavior: Behavior normal.            Current Outpatient Medications:     LAMICTAL 100 MG tablet, , Disp: , Rfl:     Semaglutide-Weight Management (WEGOVY) 1.7 MG/0.75ML SOAJ SC injection, Inject 1.7 mg into the skin every 7 days, Disp: 3 mL, Rfl: 4    Levonorgestrel (MIRENA, 52 MG, IU), by IntraUTERine route, Disp: , Rfl:     amphetamine-dextroamphetamine (ADDERALL XR) 25 MG extended release capsule, TAKE 1 CAPSULE BY MOUTH AFTER BREAKFAST, Disp: , Rfl:     LINZESS 72 MCG CAPS capsule, Take 1 capsule by mouth every

## 2024-02-27 ENCOUNTER — OFFICE VISIT (OUTPATIENT)
Dept: FAMILY MEDICINE CLINIC | Age: 44
End: 2024-02-27
Payer: COMMERCIAL

## 2024-02-27 VITALS
DIASTOLIC BLOOD PRESSURE: 80 MMHG | WEIGHT: 170 LBS | BODY MASS INDEX: 29.02 KG/M2 | TEMPERATURE: 98.5 F | RESPIRATION RATE: 18 BRPM | HEART RATE: 93 BPM | HEIGHT: 64 IN | SYSTOLIC BLOOD PRESSURE: 128 MMHG | OXYGEN SATURATION: 98 %

## 2024-02-27 DIAGNOSIS — R61 NIGHT SWEATS: ICD-10-CM

## 2024-02-27 DIAGNOSIS — R61 NIGHT SWEATS: Primary | ICD-10-CM

## 2024-02-27 LAB
ABSOLUTE IMMATURE GRANULOCYTE: <0.03 K/UL (ref 0–0.58)
BASOPHILS ABSOLUTE: 0.03 K/UL (ref 0–0.2)
BASOPHILS RELATIVE PERCENT: 1 % (ref 0–2)
C-REACTIVE PROTEIN: <3 MG/L (ref 0–5)
EOSINOPHILS ABSOLUTE: 0.08 K/UL (ref 0.05–0.5)
EOSINOPHILS RELATIVE PERCENT: 2 % (ref 0–6)
FOLLICLE STIMULATING HORMONE: 10.4 MIU/ML
HCT VFR BLD CALC: 44.4 % (ref 34–48)
HEMOGLOBIN: 14.2 G/DL (ref 11.5–15.5)
IMMATURE GRANULOCYTES: 0 % (ref 0–5)
LYMPHOCYTES ABSOLUTE: 1.5 K/UL (ref 1.5–4)
LYMPHOCYTES RELATIVE PERCENT: 28 % (ref 20–42)
MCH RBC QN AUTO: 31.3 PG (ref 26–35)
MCHC RBC AUTO-ENTMCNC: 32 G/DL (ref 32–34.5)
MCV RBC AUTO: 97.8 FL (ref 80–99.9)
MONOCYTES ABSOLUTE: 0.5 K/UL (ref 0.1–0.95)
MONOCYTES RELATIVE PERCENT: 9 % (ref 2–12)
NEUTROPHILS ABSOLUTE: 3.32 K/UL (ref 1.8–7.3)
NEUTROPHILS RELATIVE PERCENT: 61 % (ref 43–80)
PDW BLD-RTO: 13.4 % (ref 11.5–15)
PLATELET # BLD: 206 K/UL (ref 130–450)
PMV BLD AUTO: 11.7 FL (ref 7–12)
RBC # BLD: 4.54 M/UL (ref 3.5–5.5)
SEDIMENTATION RATE, ERYTHROCYTE: 2 MM/HR (ref 0–20)
TSH SERPL DL<=0.05 MIU/L-ACNC: 1.47 UIU/ML (ref 0.27–4.2)
WBC # BLD: 5.5 K/UL (ref 4.5–11.5)

## 2024-02-27 PROCEDURE — G8427 DOCREV CUR MEDS BY ELIG CLIN: HCPCS | Performed by: FAMILY MEDICINE

## 2024-02-27 PROCEDURE — 99214 OFFICE O/P EST MOD 30 MIN: CPT | Performed by: FAMILY MEDICINE

## 2024-02-27 PROCEDURE — 1036F TOBACCO NON-USER: CPT | Performed by: FAMILY MEDICINE

## 2024-02-27 PROCEDURE — G8482 FLU IMMUNIZE ORDER/ADMIN: HCPCS | Performed by: FAMILY MEDICINE

## 2024-02-27 PROCEDURE — G8417 CALC BMI ABV UP PARAM F/U: HCPCS | Performed by: FAMILY MEDICINE

## 2024-02-27 NOTE — PROGRESS NOTES
[facility]    Leeann Milan presents to the office today for   Chief Complaint   Patient presents with    Sweats     Night sweats     Night sweats  Only at night x 1 month  Started Lamictal in November but this issue didn't start right at that time  Nails are breaking/brittle  She has a Mirena in place so difficult to tell about menses  She is spotting more  She is very cold when she wakes up but feels she could change her clothes   isn't having issues with temperature in the room  Weight loss intentional on Wegovy  No fevers  She has lost about 60 lbs overall  No lymphadenopathy    Review of Systems     /80   Pulse 93   Temp 98.5 °F (36.9 °C) (Temporal)   Resp 18   Ht 1.626 m (5' 4\")   Wt 77.1 kg (170 lb)   SpO2 98%   BMI 29.18 kg/m²   Physical Exam  Constitutional:       Appearance: Normal appearance.   HENT:      Head: Normocephalic and atraumatic.   Eyes:      Extraocular Movements: Extraocular movements intact.      Conjunctiva/sclera: Conjunctivae normal.   Cardiovascular:      Rate and Rhythm: Normal rate.      Heart sounds: Normal heart sounds.   Pulmonary:      Effort: Pulmonary effort is normal.      Breath sounds: Normal breath sounds.   Lymphadenopathy:      Cervical: No cervical adenopathy.      Upper Body:      Right upper body: No supraclavicular adenopathy.      Left upper body: No supraclavicular adenopathy.   Skin:     General: Skin is warm.   Neurological:      Mental Status: She is alert and oriented to person, place, and time.   Psychiatric:         Mood and Affect: Mood normal.         Behavior: Behavior normal.            Current Outpatient Medications:     LAMICTAL 100 MG tablet, , Disp: , Rfl:     Semaglutide-Weight Management (WEGOVY) 2.4 MG/0.75ML SOAJ SC injection, Inject 2.4 mg into the skin every 7 days, Disp: 3 mL, Rfl: 2    Levonorgestrel (MIRENA, 52 MG, IU), by IntraUTERine route, Disp: , Rfl:     amphetamine-dextroamphetamine (ADDERALL XR) 25

## 2024-03-12 ENCOUNTER — OFFICE VISIT (OUTPATIENT)
Dept: BARIATRICS/WEIGHT MGMT | Age: 44
End: 2024-03-12
Payer: COMMERCIAL

## 2024-03-12 VITALS
HEIGHT: 65 IN | DIASTOLIC BLOOD PRESSURE: 65 MMHG | TEMPERATURE: 97.2 F | WEIGHT: 170.6 LBS | SYSTOLIC BLOOD PRESSURE: 104 MMHG | HEART RATE: 77 BPM | BODY MASS INDEX: 28.42 KG/M2

## 2024-03-12 DIAGNOSIS — E66.3 OVERWEIGHT (BMI 25.0-29.9): ICD-10-CM

## 2024-03-12 DIAGNOSIS — R53.82 CHRONIC FATIGUE: Primary | ICD-10-CM

## 2024-03-12 PROCEDURE — G8417 CALC BMI ABV UP PARAM F/U: HCPCS | Performed by: INTERNAL MEDICINE

## 2024-03-12 PROCEDURE — 99211 OFF/OP EST MAY X REQ PHY/QHP: CPT

## 2024-03-12 PROCEDURE — 99214 OFFICE O/P EST MOD 30 MIN: CPT | Performed by: INTERNAL MEDICINE

## 2024-03-12 PROCEDURE — G8428 CUR MEDS NOT DOCUMENT: HCPCS | Performed by: INTERNAL MEDICINE

## 2024-03-12 PROCEDURE — G8482 FLU IMMUNIZE ORDER/ADMIN: HCPCS | Performed by: INTERNAL MEDICINE

## 2024-03-12 PROCEDURE — 1036F TOBACCO NON-USER: CPT | Performed by: INTERNAL MEDICINE

## 2024-03-12 RX ORDER — NALTREXONE HYDROCHLORIDE 50 MG/1
TABLET, FILM COATED ORAL
Qty: 15 TABLET | Refills: 1 | Status: SHIPPED | OUTPATIENT
Start: 2024-03-12

## 2024-03-12 NOTE — PATIENT INSTRUCTIONS
Rules:  Count every calorie every day  Limit sweets to one day per month  Limit chips/crackers/pretzels/nuts/popcorn to 150 jose francisco/day  Eliminate all sugar sweetened beverages (including fruit juice)  Limit restaurants (including fast food and food from a convenience store) to one time every two weeks while in town    Requirements:  Make sure protein intake is at least 85 grams per day   Make sure that fiber intake is at least 25 grams per day  Take one multivitamin every day    Targets:  Limit calorie intake to 1400 calories/day  Limit net carbohydrate intake to 110 grams/day  Walk 30 minutes daily   Avoid eating 2 hours within bedtime.     Tips:  Do not eat outside of the dining room or the kitchen  Do not eat while watching TV, videos, working on the computer or using a smart phone  Do not eat food out of a multi-serving bag or container.    Wegovy:  Continue Wegovy 2.4 mg under the skin once a week.    Naltrexone:  Start by taking 12.5 mg (a quarter of 50 mg tablet) once a day for 1 week, then increase it to 25 mg (half of 50 mg tablet) daily.    Follow up in about 3 months.

## 2024-03-12 NOTE — PROGRESS NOTES
a week.    Naltrexone:  Start by taking 12.5 mg (a quarter of 50 mg tablet) once a day for 1 week, then increase it to 25 mg (half of 50 mg tablet) daily.    Follow up in about 3 months.    Medication management: Jose Xavier MD  Internal Medicine/Obesity Medicine  3/12/2024.

## 2024-04-19 DIAGNOSIS — E66.09 CLASS 1 OBESITY DUE TO EXCESS CALORIES WITH SERIOUS COMORBIDITY AND BODY MASS INDEX (BMI) OF 32.0 TO 32.9 IN ADULT: ICD-10-CM

## 2024-04-19 RX ORDER — SEMAGLUTIDE 2.4 MG/.75ML
INJECTION, SOLUTION SUBCUTANEOUS
Qty: 3 ML | Refills: 0 | Status: SHIPPED | OUTPATIENT
Start: 2024-04-19

## 2024-04-22 ENCOUNTER — TELEPHONE (OUTPATIENT)
Dept: BARIATRICS/WEIGHT MGMT | Age: 44
End: 2024-04-22

## 2024-04-22 NOTE — TELEPHONE ENCOUNTER
Prior authorization approved Wegovy Case ID: [id number]      Payer: [employer] Patient's Payer    [phone]   Your PA request has been approved. Additional information will be provided in the approval communication. (Message 2764)

## 2024-05-09 DIAGNOSIS — E66.3 OVERWEIGHT (BMI 25.0-29.9): ICD-10-CM

## 2024-05-10 DIAGNOSIS — E66.3 OVERWEIGHT (BMI 25.0-29.9): ICD-10-CM

## 2024-05-12 DIAGNOSIS — E66.09 CLASS 1 OBESITY DUE TO EXCESS CALORIES WITH SERIOUS COMORBIDITY AND BODY MASS INDEX (BMI) OF 32.0 TO 32.9 IN ADULT: ICD-10-CM

## 2024-05-12 RX ORDER — NALTREXONE HYDROCHLORIDE 50 MG/1
TABLET, FILM COATED ORAL
Qty: 15 TABLET | Refills: 0 | OUTPATIENT
Start: 2024-05-12

## 2024-05-12 RX ORDER — NALTREXONE HYDROCHLORIDE 50 MG/1
TABLET, FILM COATED ORAL
Qty: 15 TABLET | Refills: 1 | Status: SHIPPED | OUTPATIENT
Start: 2024-05-12

## 2024-05-13 RX ORDER — SEMAGLUTIDE 2.4 MG/.75ML
2.4 INJECTION, SOLUTION SUBCUTANEOUS
Qty: 3 ML | Refills: 0 | Status: SHIPPED | OUTPATIENT
Start: 2024-05-13

## 2024-05-14 ENCOUNTER — TELEPHONE (OUTPATIENT)
Dept: BARIATRICS/WEIGHT MGMT | Age: 44
End: 2024-05-14

## 2024-05-14 DIAGNOSIS — E66.3 OVERWEIGHT (BMI 25.0-29.9): ICD-10-CM

## 2024-05-14 NOTE — TELEPHONE ENCOUNTER
PAULINO in [place] called to verify script and directions since the same diretions were sent in previously for the Depade, please advise

## 2024-05-19 RX ORDER — NALTREXONE HYDROCHLORIDE 50 MG/1
TABLET, FILM COATED ORAL
Qty: 15 TABLET | Refills: 1 | Status: SHIPPED | OUTPATIENT
Start: 2024-05-19

## 2024-06-03 SDOH — ECONOMIC STABILITY: FOOD INSECURITY: WITHIN THE PAST 12 MONTHS, THE FOOD YOU BOUGHT JUST DIDN'T LAST AND YOU DIDN'T HAVE MONEY TO GET MORE.: NEVER TRUE

## 2024-06-03 SDOH — ECONOMIC STABILITY: TRANSPORTATION INSECURITY
IN THE PAST 12 MONTHS, HAS LACK OF TRANSPORTATION KEPT YOU FROM MEETINGS, WORK, OR FROM GETTING THINGS NEEDED FOR DAILY LIVING?: NO

## 2024-06-03 SDOH — ECONOMIC STABILITY: FOOD INSECURITY: WITHIN THE PAST 12 MONTHS, YOU WORRIED THAT YOUR FOOD WOULD RUN OUT BEFORE YOU GOT MONEY TO BUY MORE.: NEVER TRUE

## 2024-06-03 SDOH — ECONOMIC STABILITY: INCOME INSECURITY: HOW HARD IS IT FOR YOU TO PAY FOR THE VERY BASICS LIKE FOOD, HOUSING, MEDICAL CARE, AND HEATING?: NOT HARD AT ALL

## 2024-06-06 ENCOUNTER — OFFICE VISIT (OUTPATIENT)
Dept: FAMILY MEDICINE CLINIC | Age: 44
End: 2024-06-06

## 2024-06-06 VITALS
WEIGHT: 166 LBS | SYSTOLIC BLOOD PRESSURE: 118 MMHG | BODY MASS INDEX: 28.34 KG/M2 | OXYGEN SATURATION: 98 % | TEMPERATURE: 97.5 F | HEART RATE: 90 BPM | RESPIRATION RATE: 15 BRPM | DIASTOLIC BLOOD PRESSURE: 72 MMHG | HEIGHT: 64 IN

## 2024-06-06 DIAGNOSIS — R61 NIGHT SWEATS: Primary | ICD-10-CM

## 2024-06-06 RX ORDER — VILOXAZINE HYDROCHLORIDE 200 MG/1
CAPSULE, EXTENDED RELEASE ORAL
COMMUNITY
Start: 2024-06-03

## 2024-06-06 NOTE — PROGRESS NOTES
[facility]    Leeann Milan presents to the office today for No chief complaint on file.    She is still having night sweats  She has been seeing her psychiatry NP who is recommending she stop her Adderall  They will be trying her on a non stimulant medication    Review of Systems     /72   Pulse 90   Temp 97.5 °F (36.4 °C) (Temporal)   Resp 15   Ht 1.626 m (5' 4\")   Wt 75.3 kg (166 lb)   SpO2 98%   BMI 28.49 kg/m²   Physical Exam  Constitutional:       Appearance: Normal appearance.   HENT:      Head: Normocephalic and atraumatic.   Eyes:      Extraocular Movements: Extraocular movements intact.      Conjunctiva/sclera: Conjunctivae normal.   Cardiovascular:      Rate and Rhythm: Normal rate.      Heart sounds: Normal heart sounds.   Pulmonary:      Effort: Pulmonary effort is normal.      Breath sounds: Normal breath sounds.   Skin:     General: Skin is warm.   Neurological:      Mental Status: She is alert and oriented to person, place, and time.   Psychiatric:         Mood and Affect: Mood normal.         Behavior: Behavior normal.            Current Outpatient Medications:     QELBREE 200 MG CP24, , Disp: , Rfl:     naltrexone (DEPADE) 50 MG tablet, Take 25 mg (half of 50 mg tablet) daily., Disp: 15 tablet, Rfl: 1    Semaglutide-Weight Management (WEGOVY) 2.4 MG/0.75ML SOAJ SC injection, Inject 2.4 mg into the skin every 7 days, Disp: 3 mL, Rfl: 0    LAMICTAL 100 MG tablet, , Disp: , Rfl:     Levonorgestrel (MIRENA, 52 MG, IU), by IntraUTERine route, Disp: , Rfl:     amphetamine-dextroamphetamine (ADDERALL XR) 25 MG extended release capsule, TAKE 1 CAPSULE BY MOUTH AFTER BREAKFAST, Disp: , Rfl:     LINZESS 72 MCG CAPS capsule, Take 1 capsule by mouth every morning (before breakfast), Disp: , Rfl:     traZODone (DESYREL) 100 MG tablet, TAKE 1 TABLET BY MOUTH EVERY DAY AT BEDTIME AS NEEDED, Disp: , Rfl:     ARIPiprazole (ABILIFY) 15 MG tablet, Take 1 tablet by mouth every

## 2024-06-12 ENCOUNTER — OFFICE VISIT (OUTPATIENT)
Dept: BARIATRICS/WEIGHT MGMT | Age: 44
End: 2024-06-12
Payer: COMMERCIAL

## 2024-06-12 VITALS
HEART RATE: 87 BPM | TEMPERATURE: 96.9 F | SYSTOLIC BLOOD PRESSURE: 99 MMHG | WEIGHT: 164 LBS | BODY MASS INDEX: 27.32 KG/M2 | DIASTOLIC BLOOD PRESSURE: 66 MMHG | HEIGHT: 65 IN

## 2024-06-12 DIAGNOSIS — E66.3 OVERWEIGHT (BMI 25.0-29.9): ICD-10-CM

## 2024-06-12 DIAGNOSIS — Z79.899 HIGH RISK MEDICATION USE: ICD-10-CM

## 2024-06-12 DIAGNOSIS — R53.82 CHRONIC FATIGUE: Primary | ICD-10-CM

## 2024-06-12 PROCEDURE — 99214 OFFICE O/P EST MOD 30 MIN: CPT | Performed by: INTERNAL MEDICINE

## 2024-06-12 PROCEDURE — 1036F TOBACCO NON-USER: CPT | Performed by: INTERNAL MEDICINE

## 2024-06-12 PROCEDURE — G8417 CALC BMI ABV UP PARAM F/U: HCPCS | Performed by: INTERNAL MEDICINE

## 2024-06-12 PROCEDURE — 99211 OFF/OP EST MAY X REQ PHY/QHP: CPT

## 2024-06-12 PROCEDURE — G8428 CUR MEDS NOT DOCUMENT: HCPCS | Performed by: INTERNAL MEDICINE

## 2024-06-12 RX ORDER — NALTREXONE HYDROCHLORIDE 50 MG/1
TABLET, FILM COATED ORAL
Qty: 15 TABLET | Refills: 1 | Status: SHIPPED
Start: 2024-06-12 | End: 2024-06-12

## 2024-06-12 RX ORDER — NALTREXONE HYDROCHLORIDE 50 MG/1
TABLET, FILM COATED ORAL
Qty: 15 TABLET | Refills: 3 | Status: SHIPPED | OUTPATIENT
Start: 2024-06-12

## 2024-06-12 RX ORDER — SEMAGLUTIDE 2.4 MG/.75ML
2.4 INJECTION, SOLUTION SUBCUTANEOUS
Qty: 3 ML | Refills: 3 | Status: SHIPPED | OUTPATIENT
Start: 2024-06-12

## 2024-06-12 NOTE — PROGRESS NOTES
CC -   Fatigue, obesity    Would like to be <140 lbs    BACKGROUND -   Last visit: 12/12/23  First visit: 9/22/22    Obesity (all weight in lbs)  Began in 2018  Initial BMI 38.51, Wt 231, Ht 65\"  HS Grad wt 145   Lowest   wt 140   Highest  wt 231.4  Pattern of wt gain: gradual  Wt change past yr: +24 lbs  Most wt lost: ~20 lbs (last summer)  Other diets attempted: changed diet - more salads/fruit, walking    Desire to lose weight: 10/10    Initial Diet:    Number of meals per day - 3 (B-L-D)    Number of snacks per day - 1 (night snack)    Meal volume - 12\" plate,  never seconds    Fast food/convenience store - 0-1x/week    Restaurants (not fast food) - 0-1x/week   Sweets - 7d/week (after dinner)   Chips - 7d/week   Crackers/pretzels - 7d/week (triscuits)   Nuts - 0d/week   Peanut Butter - 0d/week   Popcorn - 0d/week   Dried fruit - 0d/week   Whole fruit - 2d/week   Breakfast cereal - 0d/week   Granola/Protein/Energy bar - 0d/week   Sugar sweetened beverages - gingerale ~1x/wk. Orange juice about 1xwk, no coffee lately, tea (calorie free), no energy drink   Protein - No supplements currently   Fiber - No supplements     Initial Exercise:    Gym membership - no (has treadmill)    Walking - no    Running - no    Resistance - no    Aerobic class - no     Bedtime 10-10:30->~4:50->7/8am. Daytime tiredness+. No daytime naps.  ______________________    PFSH -  Past Medical History:   Diagnosis Date    ADHD     Anxiety     Depression     GERD (gastroesophageal reflux disease)     Granuloma annulare     Kidney stone     Migraine     OCD (obsessive compulsive disorder)    Chronic Inflammatory Response Syndrome (??)  Past Surgical History:   Procedure Laterality Date    CHOLECYSTECTOMY      FINGER SURGERY Left 08/05/2015    Dr. Flores, ring finger; path report = granuloma annulare    LITHOTRIPSY Bilateral 10/17/2023    BILATERAL ESWL EXTRACORPOREAL SHOCK WAVE LITHOTRIPSY performed by Greg Almaraz MD at [facility] OR

## 2024-06-12 NOTE — PATIENT INSTRUCTIONS
Rules:  Count every calorie every day  Limit sweets to one day per month  Limit chips/crackers/pretzels/nuts/popcorn to 150 jose francisco/day  Eliminate all sugar sweetened beverages (including fruit juice)  Limit restaurants (including fast food and food from a convenience store) to one time every two weeks while in town    Requirements:  Make sure protein intake is at least 85 grams per day   Make sure that fiber intake is at least 25 grams per day  Take one multivitamin every day    Targets:  Limit calorie intake to 1400 calories/day  Limit net carbohydrate intake to 110 grams/day  Walk 30 minutes daily   Avoid eating 2 hours within bedtime.     Tips:  Do not eat outside of the dining room or the kitchen  Do not eat while watching TV, videos, working on the computer or using a smart phone  Do not eat food out of a multi-serving bag or container.    Wegovy:  Continue Wegovy 2.4 mg under the skin once a week.    Naltrexone:  Continue Naltrexone 25 mg (1/2 of 50 mg) daily.    Please get the blood test (hepatic function test) prior to next follow up.    Follow up in about 3 months.

## 2024-06-18 DIAGNOSIS — Z79.899 HIGH RISK MEDICATION USE: ICD-10-CM

## 2024-06-18 LAB
ALBUMIN: 4.6 G/DL (ref 3.5–5.2)
ALP BLD-CCNC: 79 U/L (ref 35–104)
ALT SERPL-CCNC: 14 U/L (ref 0–32)
AST SERPL-CCNC: 17 U/L (ref 0–31)
BILIRUB SERPL-MCNC: 0.6 MG/DL (ref 0–1.2)
BILIRUBIN DIRECT: <0.2 MG/DL (ref 0–0.3)
BILIRUBIN, INDIRECT: NORMAL MG/DL (ref 0–1)
TOTAL PROTEIN: 7.1 G/DL (ref 6.4–8.3)

## 2024-07-05 DIAGNOSIS — E66.3 OVERWEIGHT (BMI 25.0-29.9): ICD-10-CM

## 2024-07-08 RX ORDER — NALTREXONE HYDROCHLORIDE 50 MG/1
TABLET, FILM COATED ORAL
Qty: 15 TABLET | Refills: 3 | Status: SHIPPED | OUTPATIENT
Start: 2024-07-08

## 2024-08-29 ENCOUNTER — OFFICE VISIT (OUTPATIENT)
Dept: FAMILY MEDICINE CLINIC | Age: 44
End: 2024-08-29
Payer: COMMERCIAL

## 2024-08-29 VITALS
TEMPERATURE: 97.7 F | DIASTOLIC BLOOD PRESSURE: 64 MMHG | OXYGEN SATURATION: 100 % | WEIGHT: 163 LBS | HEART RATE: 95 BPM | BODY MASS INDEX: 27.83 KG/M2 | HEIGHT: 64 IN | RESPIRATION RATE: 15 BRPM | SYSTOLIC BLOOD PRESSURE: 112 MMHG

## 2024-08-29 DIAGNOSIS — Z00.00 ENCOUNTER FOR WELL ADULT EXAM WITHOUT ABNORMAL FINDINGS: Primary | ICD-10-CM

## 2024-08-29 PROCEDURE — 99396 PREV VISIT EST AGE 40-64: CPT | Performed by: FAMILY MEDICINE

## 2024-08-29 RX ORDER — CLONIDINE HYDROCHLORIDE 0.1 MG/1
TABLET ORAL
COMMUNITY
Start: 2024-08-14

## 2024-08-29 RX ORDER — TENAPANOR HYDROCHLORIDE 53.2 MG/1
1 TABLET ORAL 2 TIMES DAILY
COMMUNITY
Start: 2024-08-07

## 2024-08-29 NOTE — PROGRESS NOTES
Well Adult Note  Name: Leeann Milan Today’s Date: 2024   MRN: [id number] Sex: Female   Age: 43 y.o. Ethnicity: Unavailable / Unknown   : 1980 Race: White (non-)      Leeann Milan is here for a well adult exam.       Subjective   History:    Doing well    Seeing Dr. Leonard about weight  Weight is down    Sweats   We did work up  Going to see GYN Chanelle Concepcion at [facility]    Hx of nephrolithiasis, early CKD  Seeing nephrology    Hx of colon polyps in parents  Seeing [facility] for colonoscopy every 5 years ago    Review of Systems    Allergies   Allergen Reactions    Latex Other (See Comments) and Rash     Other reaction(s): Makes skin bleed  On contact      Cefaclor     Morphine     Penicillin G     Penicillins     Seasonal Other (See Comments)    Sulfa Antibiotics Hives     Prior to Visit Medications    Medication Sig Taking? Authorizing Provider   IBSRELA 50 MG TABS Take 1 tablet by mouth 2 times daily Yes Ama Nj MD   cloNIDine (CATAPRES) 0.1 MG tablet TAKE ONE TABLET BY MOUTH TWO TIMES A DAY AS DIRECTED Yes Ama Nj MD   naltrexone (DEPADE) 50 MG tablet Take 25 mg (half of 50 mg tablet) daily. Yes Linda Leonard MD   Semaglutide-Weight Management (WEGOVY) 2.4 MG/0.75ML SOAJ SC injection Inject 2.4 mg into the skin every 7 days Yes Linda Leonard MD   QELBREE 200 MG CP24  Yes Ama Nj MD   LAMICTAL 100 MG tablet  Yes Ama Nj MD   Levonorgestrel (MIRENA, 52 MG, IU) by IntraUTERine route Yes Ama Nj MD   traZODone (DESYREL) 100 MG tablet TAKE 1 TABLET BY MOUTH EVERY DAY AT BEDTIME AS NEEDED Yes Ama Nj MD   ARIPiprazole (ABILIFY) 15 MG tablet Take 1 tablet by mouth every morning Yes Ama Nj MD   tretinoin (RETIN-A) 0.025 % cream TAKE 1 APPLICATION TOPICALLY ONCE PER DAY FOR 1 MONTH APPLY TO FACE EVERY NIGHT Yes Ama Nj MD   fluvoxaMINE (LUVOX) 100 MG  is alert and oriented to person, place, and time.   Psychiatric:         Mood and Affect: Mood normal.         Behavior: Behavior normal.             Assessment & Plan   Encounter for well adult exam without abnormal findings        Doing well  Biometric form done  F/u with all specialists    No follow-ups on file.     Personalized Preventive Plan  Current Health Maintenance Status  Immunization History   Administered Date(s) Administered    COVID-19, J&J, (age 18y+), IM, 0.5 mL 03/25/2021    COVID-19, MODERNA BLUE border, Primary or Immunocompromised, (age 12y+), IM, 100 mcg/0.5mL 01/24/2022    Influenza Virus Vaccine 11/28/2016, 11/06/2017, 10/14/2020, 10/31/2022    Influenza, FLUARIX, FLULAVAL, FLUZONE (age 6 mo+) and AFLURIA, (age 3 y+), Quadv PF, 0.5mL 11/06/2017, 10/14/2020    Influenza, FLUCELVAX, (age 6 mo+), MDCK, Quadv PF, 0.5mL 11/17/2018, 12/04/2019, 10/04/2021, 10/03/2022, 09/29/2023    Pneumococcal, PPSV23, PNEUMOVAX 23, (age 2y+), SC/IM, 0.5mL 07/27/2017        Health Maintenance Due   Topic Date Due    Varicella vaccine (1 of 2 - 13+ 2-dose series) Never done    HIV screen  Never done    Hepatitis C screen  Never done    Hepatitis B vaccine (1 of 3 - 19+ 3-dose series) Never done    DTaP/Tdap/Td vaccine (1 - Tdap) Never done    COVID-19 Vaccine (3 - 2023-24 season) 09/01/2023    Flu vaccine (1) 08/01/2024     Recommendations for Preventive Services Due: see orders and patient instructions/AVS.

## 2024-09-05 ENCOUNTER — PATIENT MESSAGE (OUTPATIENT)
Dept: BARIATRICS/WEIGHT MGMT | Age: 44
End: 2024-09-05

## 2024-09-08 RX ORDER — SEMAGLUTIDE 1.7 MG/.75ML
1.7 INJECTION, SOLUTION SUBCUTANEOUS
Qty: 3 ML | Refills: 0 | Status: SHIPPED | OUTPATIENT
Start: 2024-09-08

## 2024-10-09 ENCOUNTER — OFFICE VISIT (OUTPATIENT)
Dept: BARIATRICS/WEIGHT MGMT | Age: 44
End: 2024-10-09
Payer: COMMERCIAL

## 2024-10-09 VITALS
DIASTOLIC BLOOD PRESSURE: 64 MMHG | HEART RATE: 79 BPM | HEIGHT: 64 IN | TEMPERATURE: 97.2 F | BODY MASS INDEX: 27.62 KG/M2 | SYSTOLIC BLOOD PRESSURE: 99 MMHG | WEIGHT: 161.8 LBS

## 2024-10-09 DIAGNOSIS — R53.82 CHRONIC FATIGUE: Primary | ICD-10-CM

## 2024-10-09 DIAGNOSIS — E66.3 OVERWEIGHT (BMI 25.0-29.9): ICD-10-CM

## 2024-10-09 PROCEDURE — 1036F TOBACCO NON-USER: CPT | Performed by: INTERNAL MEDICINE

## 2024-10-09 PROCEDURE — 99211 OFF/OP EST MAY X REQ PHY/QHP: CPT | Performed by: INTERNAL MEDICINE

## 2024-10-09 PROCEDURE — G8417 CALC BMI ABV UP PARAM F/U: HCPCS | Performed by: INTERNAL MEDICINE

## 2024-10-09 PROCEDURE — 99214 OFFICE O/P EST MOD 30 MIN: CPT | Performed by: INTERNAL MEDICINE

## 2024-10-09 PROCEDURE — G8484 FLU IMMUNIZE NO ADMIN: HCPCS | Performed by: INTERNAL MEDICINE

## 2024-10-09 PROCEDURE — G8428 CUR MEDS NOT DOCUMENT: HCPCS | Performed by: INTERNAL MEDICINE

## 2024-10-09 RX ORDER — CLONIDINE HYDROCHLORIDE 0.2 MG/1
0.2 TABLET ORAL NIGHTLY
COMMUNITY
Start: 2024-09-23

## 2024-10-09 RX ORDER — SEMAGLUTIDE 1.7 MG/.75ML
1.7 INJECTION, SOLUTION SUBCUTANEOUS
Qty: 3 ML | Refills: 2 | Status: SHIPPED | OUTPATIENT
Start: 2024-10-09

## 2024-10-09 RX ORDER — LINACLOTIDE 290 UG/1
CAPSULE, GELATIN COATED ORAL
COMMUNITY
Start: 2024-09-13

## 2024-10-09 NOTE — PROGRESS NOTES
CC -   Fatigue, obesity    Would like to be <140 lbs    BACKGROUND -   Last visit: 6/12/2024  First visit: 9/22/22    Obesity (all weight in lbs)  Began in 2018  Initial BMI 38.51, Wt 231, Ht 65\"  HS Grad wt 145   Lowest   wt 140   Highest  wt 231.4  Pattern of wt gain: gradual  Wt change past yr: +24 lbs  Most wt lost: ~20 lbs (last summer)  Other diets attempted: changed diet - more salads/fruit, walking    Desire to lose weight: 10/10    Initial Diet:    Number of meals per day - 3 (B-L-D)    Number of snacks per day - 1 (night snack)    Meal volume - 12\" plate,  never seconds    Fast food/convenience store - 0-1x/week    Restaurants (not fast food) - 0-1x/week   Sweets - 7d/week (after dinner)   Chips - 7d/week   Crackers/pretzels - 7d/week (triscuits)   Nuts - 0d/week   Peanut Butter - 0d/week   Popcorn - 0d/week   Dried fruit - 0d/week   Whole fruit - 2d/week   Breakfast cereal - 0d/week   Granola/Protein/Energy bar - 0d/week   Sugar sweetened beverages - gingerale ~1x/wk. Orange juice about 1xwk, no coffee lately, tea (calorie free), no energy drink   Protein - No supplements currently   Fiber - No supplements     Initial Exercise:    Gym membership - no (has treadmill)    Walking - no    Running - no    Resistance - no    Aerobic class - no     Bedtime 10-10:30->~4:50->7/8am. Daytime tiredness+. No daytime naps.  ______________________    PFSH -  Past Medical History:   Diagnosis Date    ADHD     Anxiety     Depression     GERD (gastroesophageal reflux disease)     Granuloma annulare     Kidney stone     Migraine     OCD (obsessive compulsive disorder)    Chronic Inflammatory Response Syndrome (??)  Past Surgical History:   Procedure Laterality Date    CHOLECYSTECTOMY      FINGER SURGERY Left 08/05/2015    Dr. Flores, ring finger; path report = granuloma annulare    LITHOTRIPSY Bilateral 10/17/2023    BILATERAL ESWL EXTRACORPOREAL SHOCK WAVE LITHOTRIPSY performed by Greg Almaraz MD at [facility] OR

## 2024-10-09 NOTE — PATIENT INSTRUCTIONS
Rules:  Limit sweets to one day per month  Limit chips/crackers/pretzels/nuts/popcorn to 150 jose francisco/day  Eliminate all sugar sweetened beverages (including fruit juice)  Limit restaurants (including fast food and food from a convenience store) to one time every two weeks while in town    Requirements:  Make sure protein intake is at least 85 grams per day   Make sure that fiber intake is at least 25 grams per day  Take one multivitamin every day    Targets:  Limit calorie intake to 1400 calories/day  Limit net carbohydrate intake to 110 grams/day  Walk 30 minutes daily   Avoid eating 2 hours within bedtime.     Tips:  Do not eat outside of the dining room or the kitchen  Do not eat while watching TV, videos, working on the computer or using a smart phone  Do not eat food out of a multi-serving bag or container.    Wegovy:  Continue Wegovy 1.7 mg under the skin once a week.    Naltrexone:  Continue Naltrexone 12.5 mg (1/4 of 50 mg) daily.    Follow up in about 3 months.

## 2025-01-06 LAB — MAMMOGRAPHY, EXTERNAL: NEGATIVE

## 2025-01-15 DIAGNOSIS — E66.3 OVERWEIGHT (BMI 25.0-29.9): ICD-10-CM

## 2025-01-20 RX ORDER — SEMAGLUTIDE 1.7 MG/.75ML
1.7 INJECTION, SOLUTION SUBCUTANEOUS
Qty: 3 ML | Refills: 1 | Status: SHIPPED | OUTPATIENT
Start: 2025-01-20

## 2025-02-06 ENCOUNTER — OFFICE VISIT (OUTPATIENT)
Dept: BARIATRICS/WEIGHT MGMT | Age: 45
End: 2025-02-06
Payer: COMMERCIAL

## 2025-02-06 VITALS
WEIGHT: 159 LBS | BODY MASS INDEX: 27.14 KG/M2 | TEMPERATURE: 96.8 F | HEART RATE: 75 BPM | DIASTOLIC BLOOD PRESSURE: 54 MMHG | SYSTOLIC BLOOD PRESSURE: 89 MMHG | HEIGHT: 64 IN

## 2025-02-06 DIAGNOSIS — R53.82 CHRONIC FATIGUE: Primary | ICD-10-CM

## 2025-02-06 DIAGNOSIS — E66.3 OVERWEIGHT (BMI 25.0-29.9): ICD-10-CM

## 2025-02-06 PROCEDURE — G8417 CALC BMI ABV UP PARAM F/U: HCPCS | Performed by: INTERNAL MEDICINE

## 2025-02-06 PROCEDURE — 99214 OFFICE O/P EST MOD 30 MIN: CPT | Performed by: INTERNAL MEDICINE

## 2025-02-06 PROCEDURE — G8427 DOCREV CUR MEDS BY ELIG CLIN: HCPCS | Performed by: INTERNAL MEDICINE

## 2025-02-06 PROCEDURE — 99211 OFF/OP EST MAY X REQ PHY/QHP: CPT | Performed by: INTERNAL MEDICINE

## 2025-02-06 PROCEDURE — 1036F TOBACCO NON-USER: CPT | Performed by: INTERNAL MEDICINE

## 2025-02-06 RX ORDER — ETONOGESTREL AND ETHINYL ESTRADIOL VAGINAL RING .015; .12 MG/D; MG/D
RING VAGINAL
COMMUNITY
Start: 2025-01-06

## 2025-02-06 RX ORDER — TENAPANOR HYDROCHLORIDE 53.2 MG/1
TABLET ORAL
COMMUNITY
Start: 2025-01-30

## 2025-02-06 RX ORDER — TIRZEPATIDE 5 MG/.5ML
5 INJECTION, SOLUTION SUBCUTANEOUS WEEKLY
Qty: 2 ML | Refills: 1 | Status: SHIPPED | OUTPATIENT
Start: 2025-02-06

## 2025-02-06 RX ORDER — ARIPIPRAZOLE 20 MG/1
TABLET ORAL
COMMUNITY
Start: 2025-01-30

## 2025-02-06 RX ORDER — FLUVOXAMINE MALEATE 50 MG
TABLET ORAL
COMMUNITY
Start: 2025-01-15

## 2025-02-06 NOTE — PATIENT INSTRUCTIONS
Rules:  Limit sweets to one day per month  Limit chips/crackers/pretzels/nuts/popcorn to 150 jose francisco/day  Eliminate all sugar sweetened beverages (including fruit juice)  Limit restaurants (including fast food and food from a convenience store) to one time every two weeks while in town    Requirements:  Make sure protein intake is at least 85 grams per day   Make sure that fiber intake is at least 25 grams per day  Take one multivitamin every day    Targets:  Limit calorie intake to 1300 calories/day  Walk 30 minutes daily   Avoid eating 2 hours within bedtime.     Tips:  Do not eat outside of the dining room or the kitchen  Do not eat while watching TV, videos, working on the computer or using a smart phone  Do not eat food out of a multi-serving bag or container.    Tirzepatide (Zepbound):  Start Tirzepatide 5 mg weekly starting 1 week after last dose of Wegovy 1.7 mg. Look for more information on this medicine online at [email or web address].destiny.com.    Follow up in about 3 months.

## 2025-02-06 NOTE — PROGRESS NOTES
CC -   Fatigue, obesity    Would like to be <140 lbs    BACKGROUND -   Last visit: 10/9/2024   First visit: 9/22/22    Obesity (all weight in lbs)  Began in 2018  Initial BMI 38.51, Wt 231, Ht 65\"  HS Grad wt 145   Lowest   wt 140   Highest  wt 231.4  Pattern of wt gain: gradual  Wt change past yr: +24 lbs  Most wt lost: ~20 lbs (last summer)  Other diets attempted: changed diet - more salads/fruit, walking    Desire to lose weight: 10/10    Initial Diet:    Number of meals per day - 3 (B-L-D)    Number of snacks per day - 1 (night snack)    Meal volume - 12\" plate,  never seconds    Fast food/convenience store - 0-1x/week    Restaurants (not fast food) - 0-1x/week   Sweets - 7d/week (after dinner)   Chips - 7d/week   Crackers/pretzels - 7d/week (triscuits)   Nuts - 0d/week   Peanut Butter - 0d/week   Popcorn - 0d/week   Dried fruit - 0d/week   Whole fruit - 2d/week   Breakfast cereal - 0d/week   Granola/Protein/Energy bar - 0d/week   Sugar sweetened beverages - gingerale ~1x/wk. Orange juice about 1xwk, no coffee lately, tea (calorie free), no energy drink   Protein - No supplements currently   Fiber - No supplements     Initial Exercise:    Gym membership - no (has treadmill)    Walking - no    Running - no    Resistance - no    Aerobic class - no     Bedtime 10-10:30->~4:50->7/8am. Daytime tiredness+. No daytime naps.  ______________________    PFSH -  Past Medical History:   Diagnosis Date    ADHD     Anxiety     Depression     GERD (gastroesophageal reflux disease)     Granuloma annulare     Kidney stone     Migraine     OCD (obsessive compulsive disorder)    Chronic Inflammatory Response Syndrome (??)  Past Surgical History:   Procedure Laterality Date    CHOLECYSTECTOMY      FINGER SURGERY Left 08/05/2015    Dr. Flores, ring finger; path report = granuloma annulare    LITHOTRIPSY Bilateral 10/17/2023    BILATERAL ESWL EXTRACORPOREAL SHOCK WAVE LITHOTRIPSY performed by Greg Almaraz MD at [facility] OR

## 2025-02-11 ENCOUNTER — TELEPHONE (OUTPATIENT)
Dept: BARIATRICS/WEIGHT MGMT | Age: 45
End: 2025-02-11

## 2025-02-11 NOTE — TELEPHONE ENCOUNTER
Prior authorization approved  Payer: [facility]  - Commercial Case ID: [id number]    [phone]  Note from payer: Your PA request has been approved. Additional information will be provided in the approval communication. (Message 1146)  Approval Details    Authorized from February 7, 2025 to October 7, 2025

## 2025-03-12 ENCOUNTER — TELEPHONE (OUTPATIENT)
Dept: BARIATRICS/WEIGHT MGMT | Age: 45
End: 2025-03-12

## 2025-03-12 DIAGNOSIS — E66.811 CLASS 1 OBESITY DUE TO EXCESS CALORIES WITH SERIOUS COMORBIDITY AND BODY MASS INDEX (BMI) OF 32.0 TO 32.9 IN ADULT: ICD-10-CM

## 2025-03-12 DIAGNOSIS — R53.82 CHRONIC FATIGUE: Primary | ICD-10-CM

## 2025-03-12 DIAGNOSIS — E66.09 CLASS 1 OBESITY DUE TO EXCESS CALORIES WITH SERIOUS COMORBIDITY AND BODY MASS INDEX (BMI) OF 32.0 TO 32.9 IN ADULT: ICD-10-CM

## 2025-03-12 RX ORDER — TIRZEPATIDE 7.5 MG/.5ML
7.5 INJECTION, SOLUTION SUBCUTANEOUS WEEKLY
Qty: 2 ML | Refills: 2 | Status: SHIPPED | OUTPATIENT
Start: 2025-03-12

## 2025-05-07 ENCOUNTER — OFFICE VISIT (OUTPATIENT)
Dept: BARIATRICS/WEIGHT MGMT | Age: 45
End: 2025-05-07
Payer: COMMERCIAL

## 2025-05-07 VITALS
DIASTOLIC BLOOD PRESSURE: 54 MMHG | TEMPERATURE: 97 F | WEIGHT: 155.6 LBS | SYSTOLIC BLOOD PRESSURE: 86 MMHG | HEIGHT: 64 IN | BODY MASS INDEX: 26.56 KG/M2 | HEART RATE: 76 BPM

## 2025-05-07 DIAGNOSIS — E66.811 CLASS 1 OBESITY DUE TO EXCESS CALORIES WITH SERIOUS COMORBIDITY AND BODY MASS INDEX (BMI) OF 32.0 TO 32.9 IN ADULT: ICD-10-CM

## 2025-05-07 DIAGNOSIS — R53.82 CHRONIC FATIGUE: Primary | ICD-10-CM

## 2025-05-07 DIAGNOSIS — E66.09 CLASS 1 OBESITY DUE TO EXCESS CALORIES WITH SERIOUS COMORBIDITY AND BODY MASS INDEX (BMI) OF 32.0 TO 32.9 IN ADULT: ICD-10-CM

## 2025-05-07 PROCEDURE — 1036F TOBACCO NON-USER: CPT | Performed by: INTERNAL MEDICINE

## 2025-05-07 PROCEDURE — G8417 CALC BMI ABV UP PARAM F/U: HCPCS | Performed by: INTERNAL MEDICINE

## 2025-05-07 PROCEDURE — 99211 OFF/OP EST MAY X REQ PHY/QHP: CPT

## 2025-05-07 PROCEDURE — G8428 CUR MEDS NOT DOCUMENT: HCPCS | Performed by: INTERNAL MEDICINE

## 2025-05-07 PROCEDURE — 99214 OFFICE O/P EST MOD 30 MIN: CPT | Performed by: INTERNAL MEDICINE

## 2025-05-07 NOTE — PATIENT INSTRUCTIONS
Rules:  Limit sweets to one day per month  Limit chips/crackers/pretzels/nuts/popcorn to 150 jose francisco/day  Eliminate all sugar sweetened beverages (including fruit juice)  Limit restaurants (including fast food and food from a convenience store) to one time every two weeks while in town    Requirements:  Make sure protein intake is at least 85 grams per day   Make sure that fiber intake is at least 25 grams per day  Take one multivitamin every day    Targets:  Limit calorie intake to 1300 calories/day  Walk 30 minutes daily   Avoid eating 2 hours within bedtime.     Tips:  Do not eat outside of the dining room or the kitchen  Do not eat while watching TV, videos, working on the computer or using a smart phone  Do not eat food out of a multi-serving bag or container.    Tirzepatide (Zepbound):  Start Tirzepatide 10 mg weekly starting 5/9/2025.    Follow up in about 3 months.

## 2025-05-07 NOTE — PROGRESS NOTES
CC -   Fatigue, obesity    BACKGROUND -   Last visit: 2/6/2025  First visit: 9/22/22    Obesity (all weight in lbs)  Began in 2018  Initial BMI 38.51, Wt 231, Ht 65\"  HS Grad wt 145   Lowest   wt 140   Highest  wt 231.4  Pattern of wt gain: gradual  Wt change past yr: +24 lbs  Most wt lost: ~20 lbs (last summer)  Other diets attempted: changed diet - more salads/fruit, walking    Desire to lose weight: 10/10 (Patient's own goal weight: 145 lbs)    Initial Diet:    Number of meals per day - 3 (B-L-D)    Number of snacks per day - 1 (night snack)    Meal volume - 12\" plate,  never seconds    Fast food/convenience store - 0-1x/week    Restaurants (not fast food) - 0-1x/week   Sweets - 7d/week (after dinner)   Chips - 7d/week   Crackers/pretzels - 7d/week (triscuits)   Nuts - 0d/week   Peanut Butter - 0d/week   Popcorn - 0d/week   Dried fruit - 0d/week   Whole fruit - 2d/week   Breakfast cereal - 0d/week   Granola/Protein/Energy bar - 0d/week   Sugar sweetened beverages - gingerale ~1x/wk. Orange juice about 1xwk, no coffee lately, tea (calorie free), no energy drink   Protein - No supplements currently   Fiber - No supplements     Initial Exercise:    Gym membership - no (has treadmill)    Walking - no    Running - no    Resistance - no    Aerobic class - no     Bedtime 10-10:30->~4:50->7/8am. Daytime tiredness+. No daytime naps.  ______________________    PFSH -  Past Medical History:   Diagnosis Date    ADHD     Anxiety     Depression     GERD (gastroesophageal reflux disease)     Granuloma annulare     Kidney stone     Migraine     OCD (obsessive compulsive disorder)    Chronic Inflammatory Response Syndrome (??)  Past Surgical History:   Procedure Laterality Date    CHOLECYSTECTOMY      FINGER SURGERY Left 08/05/2015    Dr. Flores, ring finger; path report = granuloma annulare    LITHOTRIPSY Bilateral 10/17/2023    BILATERAL ESWL EXTRACORPOREAL SHOCK WAVE LITHOTRIPSY performed by Greg Almaraz MD at [facility]

## 2025-06-13 ENCOUNTER — TELEPHONE (OUTPATIENT)
Age: 45
End: 2025-06-13

## 2025-06-13 DIAGNOSIS — E66.09 CLASS 1 OBESITY DUE TO EXCESS CALORIES WITH SERIOUS COMORBIDITY AND BODY MASS INDEX (BMI) OF 32.0 TO 32.9 IN ADULT: ICD-10-CM

## 2025-06-13 DIAGNOSIS — R53.82 CHRONIC FATIGUE: ICD-10-CM

## 2025-06-13 DIAGNOSIS — E66.811 CLASS 1 OBESITY DUE TO EXCESS CALORIES WITH SERIOUS COMORBIDITY AND BODY MASS INDEX (BMI) OF 32.0 TO 32.9 IN ADULT: ICD-10-CM

## 2025-06-13 NOTE — TELEPHONE ENCOUNTER
Zepbound will no longer be covered from July 1, 2025. After talking to Ms. Milan over the phone, I ordered Wegovy 1 mg (she is currently on Zepbound 10 mg).    Linda Leonard MD  Internal Medicine/Obesity Medicine  6/13/2025.

## 2025-07-25 NOTE — PROGRESS NOTES
no LE pitting edema b/l  Psych: Normal mood   Full affect  Neuro: Moves all ext well  ______________________    HISTORY & ASSESSMENT/PLAN -     Problem 1  - Fatigue/tiredness   HPI   - overall has been feeling fatigued/tired, which is worsening, sleep is good  Assessment  - uncontrolled  Plan   - Chronic inflammatory response syndrome may be contributing, medications discussed (BP low but pt currently asymptomatic, does get lightheaded at times). Weight reduction can help if weight is contributing to fatigue. Weight reduction per plan below    Problem 2  - Obesity   HPI   - See above Background for description    Weight  Date    231.4  9/22/22    220.0  10/25/22    213.0  12/6/22    215.0  2/2/23      Phentermine #1      211.8  3/7/23      Phentermine #2  214.4  4/3/23      Phentermine #3       Total weight change to date: -17.0 lbs. Average daily energy variance:  9/22/2022 - 10/25/2022: -9.8 lbs (17498 Bk)/32 d = -1072 Bk/d deficit. 10/25/2022 - 12/6/2022: -7.0 lbs (07525 Bk)/42 d = -583 Bk/d deficit. 12/6/2022 - 2/2/2023: +2.0 lbs (7000 Bk)/58 d = +121 Bk/d excess. 2/2/2023 - 3/7/2023: -3.2 lbs (88110 Bk)/33 d = -339 Bk/d deficit. 3/7/2023 - 4/3/2023: +2.6 lbs (9100 Bk)/27 d = +338 Bk/d excess.     Patient's estimated daily energy need (DEN) = 2101 Bk/d = 21136 Bk/wk    Update:  Calorie monitoring: was out for trip for a few days, plus some other celebrations  Sweets: some celebrations  SSB: couple of drinks in a concert  Restaurant food: when out of town (tried to have something healthy  Appetite suppressant: Not on Vyvanse so Phentermine was started, 37.5 mg, now trying to get up early and take whole tablet, appetite suppression: 8/10, side effects: some dry mouth and constipation (fairly stable)  Home BP monitoring: has been WNL at home  Protein: ~50-80g/d, natural foods  Fiber: vegetables, no supplement  Water: 2L/d - increased d/t dry mouth  Activities: walking daily (smt 10k
This was a shared visit with the KENDAL. I reviewed and verified the documentation.

## 2025-08-08 DIAGNOSIS — E66.811 CLASS 1 OBESITY DUE TO EXCESS CALORIES WITH SERIOUS COMORBIDITY AND BODY MASS INDEX (BMI) OF 32.0 TO 32.9 IN ADULT: ICD-10-CM

## 2025-08-08 DIAGNOSIS — E66.09 CLASS 1 OBESITY DUE TO EXCESS CALORIES WITH SERIOUS COMORBIDITY AND BODY MASS INDEX (BMI) OF 32.0 TO 32.9 IN ADULT: ICD-10-CM

## 2025-08-08 DIAGNOSIS — R53.82 CHRONIC FATIGUE: ICD-10-CM

## 2025-08-09 RX ORDER — SEMAGLUTIDE 1 MG/.5ML
INJECTION, SOLUTION SUBCUTANEOUS
Qty: 2 ML | Refills: 0 | Status: SHIPPED | OUTPATIENT
Start: 2025-08-09

## 2025-09-02 ENCOUNTER — OFFICE VISIT (OUTPATIENT)
Dept: FAMILY MEDICINE CLINIC | Age: 45
End: 2025-09-02
Payer: COMMERCIAL

## 2025-09-02 VITALS
BODY MASS INDEX: 26.98 KG/M2 | WEIGHT: 158 LBS | HEART RATE: 85 BPM | HEIGHT: 64 IN | TEMPERATURE: 97.7 F | RESPIRATION RATE: 16 BRPM | SYSTOLIC BLOOD PRESSURE: 112 MMHG | DIASTOLIC BLOOD PRESSURE: 72 MMHG | OXYGEN SATURATION: 95 %

## 2025-09-02 DIAGNOSIS — M79.645 FINGER PAIN, LEFT: ICD-10-CM

## 2025-09-02 DIAGNOSIS — E66.3 OVERWEIGHT (BMI 25.0-29.9): ICD-10-CM

## 2025-09-02 DIAGNOSIS — Z00.00 ENCOUNTER FOR WELL ADULT EXAM WITHOUT ABNORMAL FINDINGS: Primary | ICD-10-CM

## 2025-09-02 DIAGNOSIS — Z87.448 HX OF CHRONIC KIDNEY DISEASE: ICD-10-CM

## 2025-09-02 DIAGNOSIS — K58.2 IRRITABLE BOWEL SYNDROME WITH BOTH CONSTIPATION AND DIARRHEA: ICD-10-CM

## 2025-09-02 PROCEDURE — G8417 CALC BMI ABV UP PARAM F/U: HCPCS | Performed by: FAMILY MEDICINE

## 2025-09-02 PROCEDURE — G8427 DOCREV CUR MEDS BY ELIG CLIN: HCPCS | Performed by: FAMILY MEDICINE

## 2025-09-02 PROCEDURE — 99213 OFFICE O/P EST LOW 20 MIN: CPT | Performed by: FAMILY MEDICINE

## 2025-09-02 PROCEDURE — 1036F TOBACCO NON-USER: CPT | Performed by: FAMILY MEDICINE

## 2025-09-02 PROCEDURE — 99396 PREV VISIT EST AGE 40-64: CPT | Performed by: FAMILY MEDICINE

## 2025-09-03 DIAGNOSIS — R53.82 CHRONIC FATIGUE: ICD-10-CM

## 2025-09-03 DIAGNOSIS — E66.811 CLASS 1 OBESITY DUE TO EXCESS CALORIES WITH SERIOUS COMORBIDITY AND BODY MASS INDEX (BMI) OF 32.0 TO 32.9 IN ADULT: ICD-10-CM

## 2025-09-03 DIAGNOSIS — E66.09 CLASS 1 OBESITY DUE TO EXCESS CALORIES WITH SERIOUS COMORBIDITY AND BODY MASS INDEX (BMI) OF 32.0 TO 32.9 IN ADULT: ICD-10-CM

## 2025-09-03 RX ORDER — SEMAGLUTIDE 1 MG/.5ML
1 INJECTION, SOLUTION SUBCUTANEOUS
Qty: 2 ML | Refills: 0 | Status: SHIPPED | OUTPATIENT
Start: 2025-09-03